# Patient Record
Sex: FEMALE | Race: OTHER | NOT HISPANIC OR LATINO | URBAN - METROPOLITAN AREA
[De-identification: names, ages, dates, MRNs, and addresses within clinical notes are randomized per-mention and may not be internally consistent; named-entity substitution may affect disease eponyms.]

---

## 2019-07-26 ENCOUNTER — EMERGENCY (EMERGENCY)
Facility: HOSPITAL | Age: 59
LOS: 0 days | Discharge: HOME | End: 2019-07-27
Attending: EMERGENCY MEDICINE | Admitting: EMERGENCY MEDICINE
Payer: COMMERCIAL

## 2019-07-26 VITALS
SYSTOLIC BLOOD PRESSURE: 143 MMHG | OXYGEN SATURATION: 98 % | TEMPERATURE: 99 F | DIASTOLIC BLOOD PRESSURE: 71 MMHG | HEART RATE: 108 BPM | RESPIRATION RATE: 19 BRPM

## 2019-07-26 DIAGNOSIS — N89.8 OTHER SPECIFIED NONINFLAMMATORY DISORDERS OF VAGINA: ICD-10-CM

## 2019-07-26 DIAGNOSIS — R35.0 FREQUENCY OF MICTURITION: ICD-10-CM

## 2019-07-26 DIAGNOSIS — N93.9 ABNORMAL UTERINE AND VAGINAL BLEEDING, UNSPECIFIED: ICD-10-CM

## 2019-07-26 LAB
ALBUMIN SERPL ELPH-MCNC: 4.4 G/DL — SIGNIFICANT CHANGE UP (ref 3.5–5.2)
ALP SERPL-CCNC: 93 U/L — SIGNIFICANT CHANGE UP (ref 30–115)
ALT FLD-CCNC: 18 U/L — SIGNIFICANT CHANGE UP (ref 0–41)
ANION GAP SERPL CALC-SCNC: 13 MMOL/L — SIGNIFICANT CHANGE UP (ref 7–14)
APPEARANCE UR: ABNORMAL
APTT BLD: 32 SEC — SIGNIFICANT CHANGE UP (ref 27–39.2)
AST SERPL-CCNC: 19 U/L — SIGNIFICANT CHANGE UP (ref 0–41)
BACTERIA # UR AUTO: ABNORMAL /HPF
BASOPHILS # BLD AUTO: 0.04 K/UL — SIGNIFICANT CHANGE UP (ref 0–0.2)
BASOPHILS NFR BLD AUTO: 0.7 % — SIGNIFICANT CHANGE UP (ref 0–1)
BILIRUB SERPL-MCNC: 0.3 MG/DL — SIGNIFICANT CHANGE UP (ref 0.2–1.2)
BILIRUB UR-MCNC: NEGATIVE — SIGNIFICANT CHANGE UP
BLD GP AB SCN SERPL QL: SIGNIFICANT CHANGE UP
BUN SERPL-MCNC: 13 MG/DL — SIGNIFICANT CHANGE UP (ref 10–20)
CALCIUM SERPL-MCNC: 9.6 MG/DL — SIGNIFICANT CHANGE UP (ref 8.5–10.1)
CHLORIDE SERPL-SCNC: 103 MMOL/L — SIGNIFICANT CHANGE UP (ref 98–110)
CO2 SERPL-SCNC: 26 MMOL/L — SIGNIFICANT CHANGE UP (ref 17–32)
COLOR SPEC: YELLOW — SIGNIFICANT CHANGE UP
CREAT SERPL-MCNC: 0.7 MG/DL — SIGNIFICANT CHANGE UP (ref 0.7–1.5)
DIFF PNL FLD: ABNORMAL
EOSINOPHIL # BLD AUTO: 0.11 K/UL — SIGNIFICANT CHANGE UP (ref 0–0.7)
EOSINOPHIL NFR BLD AUTO: 1.8 % — SIGNIFICANT CHANGE UP (ref 0–8)
GLUCOSE SERPL-MCNC: 101 MG/DL — HIGH (ref 70–99)
GLUCOSE UR QL: NEGATIVE — SIGNIFICANT CHANGE UP
HCT VFR BLD CALC: 41 % — SIGNIFICANT CHANGE UP (ref 37–47)
HGB BLD-MCNC: 13.7 G/DL — SIGNIFICANT CHANGE UP (ref 12–16)
IMM GRANULOCYTES NFR BLD AUTO: 0.3 % — SIGNIFICANT CHANGE UP (ref 0.1–0.3)
INR BLD: 1 RATIO — SIGNIFICANT CHANGE UP (ref 0.65–1.3)
KETONES UR-MCNC: NEGATIVE — SIGNIFICANT CHANGE UP
LEUKOCYTE ESTERASE UR-ACNC: ABNORMAL
LYMPHOCYTES # BLD AUTO: 2.91 K/UL — SIGNIFICANT CHANGE UP (ref 1.2–3.4)
LYMPHOCYTES # BLD AUTO: 48.3 % — SIGNIFICANT CHANGE UP (ref 20.5–51.1)
MCHC RBC-ENTMCNC: 28.2 PG — SIGNIFICANT CHANGE UP (ref 27–31)
MCHC RBC-ENTMCNC: 33.4 G/DL — SIGNIFICANT CHANGE UP (ref 32–37)
MCV RBC AUTO: 84.4 FL — SIGNIFICANT CHANGE UP (ref 81–99)
MONOCYTES # BLD AUTO: 0.31 K/UL — SIGNIFICANT CHANGE UP (ref 0.1–0.6)
MONOCYTES NFR BLD AUTO: 5.1 % — SIGNIFICANT CHANGE UP (ref 1.7–9.3)
NEUTROPHILS # BLD AUTO: 2.63 K/UL — SIGNIFICANT CHANGE UP (ref 1.4–6.5)
NEUTROPHILS NFR BLD AUTO: 43.8 % — SIGNIFICANT CHANGE UP (ref 42.2–75.2)
NITRITE UR-MCNC: NEGATIVE — SIGNIFICANT CHANGE UP
NRBC # BLD: 0 /100 WBCS — SIGNIFICANT CHANGE UP (ref 0–0)
PH UR: 6 — SIGNIFICANT CHANGE UP (ref 5–8)
PLATELET # BLD AUTO: 332 K/UL — SIGNIFICANT CHANGE UP (ref 130–400)
POTASSIUM SERPL-MCNC: 4.1 MMOL/L — SIGNIFICANT CHANGE UP (ref 3.5–5)
POTASSIUM SERPL-SCNC: 4.1 MMOL/L — SIGNIFICANT CHANGE UP (ref 3.5–5)
PROT SERPL-MCNC: 7.5 G/DL — SIGNIFICANT CHANGE UP (ref 6–8)
PROT UR-MCNC: 100
PROTHROM AB SERPL-ACNC: 11.5 SEC — SIGNIFICANT CHANGE UP (ref 9.95–12.87)
RBC # BLD: 4.86 M/UL — SIGNIFICANT CHANGE UP (ref 4.2–5.4)
RBC # FLD: 13.1 % — SIGNIFICANT CHANGE UP (ref 11.5–14.5)
RBC CASTS # UR COMP ASSIST: ABNORMAL /HPF
SODIUM SERPL-SCNC: 142 MMOL/L — SIGNIFICANT CHANGE UP (ref 135–146)
SP GR SPEC: 1.02 — SIGNIFICANT CHANGE UP (ref 1.01–1.03)
UROBILINOGEN FLD QL: 0.2 — SIGNIFICANT CHANGE UP (ref 0.2–0.2)
WBC # BLD: 6.02 K/UL — SIGNIFICANT CHANGE UP (ref 4.8–10.8)
WBC # FLD AUTO: 6.02 K/UL — SIGNIFICANT CHANGE UP (ref 4.8–10.8)
WBC UR QL: ABNORMAL /HPF

## 2019-07-26 PROCEDURE — 76830 TRANSVAGINAL US NON-OB: CPT | Mod: 26

## 2019-07-26 PROCEDURE — 99284 EMERGENCY DEPT VISIT MOD MDM: CPT

## 2019-07-26 NOTE — ED PROVIDER NOTE - ATTENDING CONTRIBUTION TO CARE
58 yo f with no pmh, recently dx with ovarian neoplasm, has an appt with dr. umanzor next week, presents with c/o vaginal bleeding since yesterday.  pt says she is using about 6 pads a day.  no abd pain.  pt was dx with ovarian mass 2 months ago, confirmed on MRI recently.  her gyn dr. Saunders had recommended hysterectomy, but pt wanted a 2nd opinion with dr. umanzor.  pt called dr umanzor's office today due to new bleeding and was told to go to ED for evaluation.  pt denies f/c, urinarys sx, back pain, weight loss, cp, sob.  pt admits has been having vaginal dc for 2 weeks.  exam: nad, ncat, perrl, eomi, mmm, rrr, ctab, abd soft, nt,nd aox3, pelvic exam as per dr. swanson imp: pt with ovarian mass, here with vaginal bleeding.  labs, us, gyn consult.

## 2019-07-26 NOTE — ED PROVIDER NOTE - PHYSICAL EXAMINATION
CONSTITUTIONAL: Well-developed; well-nourished; in no acute distress.   SKIN: warm, dry  HEAD: Normocephalic; atraumatic.  EYES: no conjunctival injection. PERRLA. EOMI.   ENT: No nasal discharge; airway clear.  NECK: Supple; non tender.  CARD: S1, S2 normal; no murmurs, gallops, or rubs. Regular rate and rhythm.   RESP: No wheezes, rales or rhonchi.  ABD: soft ntnd. BS+ in all 4 quadrants. no CVA tenderness. no suprapubic tenderness.   EXT: Normal ROM.  No clubbing, cyanosis or edema.   LYMPH: No acute cervical adenopathy.  NEURO: Alert, oriented, grossly unremarkable.  PSYCH: Cooperative, appropriate.

## 2019-07-26 NOTE — ED ADULT NURSE NOTE - OBJECTIVE STATEMENT
Pt came in today for light vaginal bleeding since yesterday. Pt recently had MRI, US showing ovarian and midline lesion. Pt looking for second opinion.

## 2019-07-26 NOTE — ED PROVIDER NOTE - NS ED ROS FT
Review of Systems:  CONSTITUTIONAL: No fever, No diaphoresis, No weight change  SKIN: No rash  HEMATOLOGIC: No abnormal bleeding or bruising  EYES: No eye pain, No blurred vision  ENT: No change in hearing, No sore throat, No neck pain, No rhinorrhea, No ear pain  RESPIRATORY: No shortness of breath, No cough  CARDIAC: No chest pain, No palpitations  GI: No abdominal pain, No nausea, No vomiting, No diarrhea, No constipation, No bright red blood per rectum or melena. No flank pain  : No dysuria, or hematuria. (+) frequency. (+) vaginal bleeding. (+) vaginal discharge.  MUSCULOSKELETAL: No joint paint, No swelling, No back pain  NEUROLOGIC: No numbness, No focal weakness, No headache, No dizziness  All other systems negative, unless specified in HPI

## 2019-07-26 NOTE — ED PROVIDER NOTE - CONSULTANT FREE TEXT FOR MDM DISCUSSED CASE WITH QUESTION
Problem: Patient Care Overview  Goal: Plan of Care Review  Outcome: Ongoing (interventions implemented as appropriate)  Patient moving around better today. PT doc 20 FT with flora-walker and minimal asist with weight-bearing to LLE. Patient ambulated w/walker assist to front solarium. Family visit and social outside today per Dr. Gauthier's approval. Afebrile. Hydrocodone prn pain. Agrees with plan of care. No further questions.        GYN Dr. Quinones

## 2019-07-26 NOTE — CONSULT NOTE ADULT - ASSESSMENT
60 yo  LMP at age 49 with adnexal mass, clinically stable  -Recommend , CEA, , Inhibin A, Inhibin B    FINAL RECOMMENDATIONS PENDING ATTENDING REVIEW 58 yo  LMP at age 49 with adnexal mass and resolved vaginal bleeding, clinically stable  -Recommend , CEA, , Inhibin A, Inhibin B, to be drawn in ED  -Recommended endometrial biopsy, patient declined as she is not comfortable with the exam and is planning to have a hysterectomy in the near future.  -Follow up with gyn oncology as scheduled on  at 1:00pm  -Disposition per ED    Discussed with Dr. Sarah and Dr. Arita

## 2019-07-26 NOTE — ED PROVIDER NOTE - NSFOLLOWUPINSTRUCTIONS_ED_ALL_ED_FT
Postmenopausal Bleeding    Postmenopausal bleeding is any bleeding that happens after menopause. Menopause is when a woman's period stops. Any type of bleeding after menopause should be checked by your doctor. Treatment will depend on the cause.    Follow these instructions at home:  Pay attention to any changes in your symptoms.  Avoid using tampons and douches as told by your doctor.  Change your pads regularly.  Get regular pelvic exams and Pap tests.  Take iron pills as told by your doctor.  Take over-the-counter and prescription medicines only as told by your doctor.  Keep all follow-up visits as told by your doctor. This is important.  Contact a doctor if:  Your bleeding lasts for more than 1 week.  You have belly (abdominal) pain.  You have bleeding during or after sex (intercourse).  You have bleeding that happens more often than every 3 weeks.  Get help right away if:  You have a fever, chills, a headache, dizziness, muscle aches, and bleeding.  You have strong pain with bleeding.  You have clumps of blood (blood clots) coming from your vagina.  You have a lot of bleeding and:  You need more than 1 pad an hour.  This has never happened before.  You feel like you are going to pass out (faint).  Summary  Any type of bleeding after menopause should be checked by your doctor.  Pay attention to any changes in your symptoms.  Keep all follow-up visits as told by your doctor.  This information is not intended to replace advice given to you by your health care provider. Make sure you discuss any questions you have with your health care provider.

## 2019-07-26 NOTE — ED PROVIDER NOTE - CLINICAL SUMMARY MEDICAL DECISION MAKING FREE TEXT BOX
Hx recent dx ovarian CA presenting with vaginal bleeding/discharge. labs, imaging reviewed. D/w Dr. Quinones, OBGYN. Said patient okay for discharge. Offered endometrial biopsy but pt refused. Has appt with gyn/onc on Tuesday. Given return precautions. Will send tumor markers and DC home, he will follow tumor markers.

## 2019-07-26 NOTE — ED PROVIDER NOTE - PROGRESS NOTE DETAILS
kiki - Pelvic exam performed. No blood in the vault. bright yellow discharge pooling. cervical os nonerythematous and without active discharge. kiki - spoke with US tech. pt has right ovarian neoplasm extending to left side measuring 12cm. unable to visualize hydrosalpinx. will call gyn. kiki - spoke to Sravan from [facility], will come [place]. D/w Dr. Quinones, OBGYN. Said patient okay for discharge. Offered endometrial biopsy but pt refused. Has appt with gyn/onc on Tuesday. Given return precautions. Will send tumor markers and DC home.

## 2019-07-26 NOTE — CONSULT NOTE ADULT - SUBJECTIVE AND OBJECTIVE BOX
Chief Complaint: Vaginal bleeding    HPI: 60 yo  LMP at age 49, h/o hypercholesterolemia presented to ED for 2 weeks of yellow vaginal discharge and 1 day of light vaginal bleeding. The discharge is scant and does not require pads. The vaginal bleeding required 4-5 pads today, lightly stained. She saw Dr. Lockhart in [place] and was found to have a pelvic mass and a possible "swollen tube" according to outpatient sonogram and MRI done in [place] (reports and images not available). She denies dizziness, weakness, fevers, chills, shortness of breath, chest pain, nausea, vomiting, dysuria, hematuria, diarrhea, or constipation. She was referred by Dr. Lockhart to Dr. Mcnally for Gyn Onc consultation and has an appointment with him on .    Ob/Gyn History:     Full term vaginal delivery x3, SAB x1              LMP - 10 years ago, age 49  Denies history of ovarian cysts, uterine fibroids, abnormal paps, or STIs  Last Pap Smear - 1 year ago, normal per patient  Last Mammogram - 1 year ago, normal per patient  Last Colonoscopy - Never      Denies the following: constitutional symptoms, visual symptoms, cardiovascular symptoms, respiratory symptoms, GI symptoms, musculoskeletal symptoms, skin symptoms, neurologic symptoms, hematologic symptoms, allergic symptoms, psychiatric symptoms  Except any pertinent positives listed.     Home Medications:  Atorvastatin 10mg daily    Allergies  No Known Allergies    PAST MEDICAL & SURGICAL HISTORY:  Denies    FAMILY HISTORY:  Denies    SOCIAL HISTORY: Denies cigarette use, alcohol use, or illicit drug use    Vital Signs Last 24 Hrs  T(F): 98.9 (2019 15:56), Max: 98.9 (2019 15:56)  HR: 108 (2019 15:56) (108 - 108)  BP: 143/71 (2019 15:56) (143/71 - 143/71)  RR: 19 (2019 15:56) (19 - 19)    General Appearance - AAOx3, NAD  Heart - S1S2 regular rate and rhythm  Lung - CTA Bilaterally  Abdomen - Soft, nontender, nondistended, no rebound, no rigidity, no guarding, bowel sounds present. Palpable firm, freely mobile mildline lower abdominal mass    GYN/Pelvis:    Labia Majora - Normal, atrophic  Labia Minora - Normal, atrophic  Clitoris - Normal, atrophic  Urethra - Normal, atrophic  Vagina - Normal, atrophic, no blood, no lesions  Cervix - Normal, atrophic, scant clear yellow discharge, no active bleeding, no CMT, cervix freely mobile.    Uterus:  Midline 14 cm mass, freely mobile, firm. Unable to differentiate uterus from adnexal mass. No tenderness.      LABS:                        13.7   6.02  )-----------( 332      ( 2019 19:00 )             41.0       ABO RH Interpretation: O POS [2019 20:27  [id number]  No historical record of blood group and Rh, requires a second sample for  retype prior to the release of any blood products.  For prenatal, a  second sample on next visit.] (19 @ 19:00)  Antibody Screen: NEG (19 @ 19:00)        142  |  103  |  13  ----------------------------<  101<H>  4.1   |  26  |  0.7    Ca    9.6      2019 19:00    TPro  7.5  /  Alb  4.4  /  TBili  0.3  /  DBili  x   /  AST  19  /  ALT  18  /  AlkPhos  93      PT/INR - ( 2019 19:00 )   PT: 11.50 sec;   INR: 1.00 ratio         PTT - ( 2019 19:00 )  PTT:32.0 sec  Urinalysis Basic - ( 2019 19:00 )    Color: Yellow / Appearance: Cloudy / S.020 / pH: x  Gluc: x / Ketone: Negative  / Bili: Negative / Urobili: 0.2   Blood: x / Protein: 100 / Nitrite: Negative   Leuk Esterase: Small / RBC: 11-25 /HPF / WBC 6-10 /HPF   Sq Epi: x / Non Sq Epi: x / Bacteria: Few /HPF      RADIOLOGY & ADDITIONAL STUDIES:    < from: US Transvaginal (19 @ 21:32) >  ******PRELIMINARY REPORT******    ******PRELIMINARY REPORT******          INTERPRETATION:  CLINICAL HISTORY: Pelvic mass    COMPARISON: None.    PROCEDURE: Transabdominal and transvaginal ultrasound of the pelvis was   performed, including Doppler.    LMP:     FINDINGS:    UTERUS: The uterus measures 7.4 x 5.6 x 3.8 cm, with normal echogenicity   and morphology. The endometrial echo complex measures 0.3 cm, which is   normal in thickness. A very trace amount of endometrial fluid is seen.    RIGHT OVARY: Within the right adnexa, extending past the midline, a   part-cystic, heterogeneous mass with internal vascularity measures 12.4 x   11.2 x 7.2 cm without a distinguishable native ovary. An associated   dilated tubular structure is seen adjacent to this mass, possibly   representing hydrosalpinx.    LEFT OVARY: Nonspecific nonvisualization of the left ovary.     OTHER: No free fluid in the pelvis.    IMPRESSION:    12.4 cm nonspecific, partially-cystic, heterogeneous mass in the right   adnexa crossing the midline, likely arising from the right ovary. There   is likely an associated right hydrosalpinx. Note that given the size of   the above structure, laterality cannot be definitely ascertained.    Gynecologic consultation recommended.    The above findings were documented in the patient's electronic medical   record by the clinicalservice.       ******PRELIMINARY REPORT******    ******PRELIMINARY REPORT******      < end of copied text >

## 2019-07-27 VITALS
SYSTOLIC BLOOD PRESSURE: 118 MMHG | HEART RATE: 72 BPM | TEMPERATURE: 96 F | RESPIRATION RATE: 18 BRPM | DIASTOLIC BLOOD PRESSURE: 68 MMHG

## 2019-07-27 LAB
CANCER AG125 SERPL-ACNC: 88 U/ML — HIGH
CANCER AG19-9 SERPL-ACNC: 693 U/ML — HIGH
CEA SERPL-MCNC: 7.2 NG/ML — HIGH (ref 0–3.8)

## 2019-07-30 ENCOUNTER — APPOINTMENT (OUTPATIENT)
Dept: GYNECOLOGIC ONCOLOGY | Facility: CLINIC | Age: 59
End: 2019-07-30
Payer: COMMERCIAL

## 2019-07-30 VITALS
DIASTOLIC BLOOD PRESSURE: 70 MMHG | WEIGHT: 148 LBS | HEIGHT: 59 IN | HEART RATE: 76 BPM | BODY MASS INDEX: 29.84 KG/M2 | SYSTOLIC BLOOD PRESSURE: 130 MMHG | RESPIRATION RATE: 18 BRPM

## 2019-07-30 DIAGNOSIS — Z78.9 OTHER SPECIFIED HEALTH STATUS: ICD-10-CM

## 2019-07-30 LAB — INHIBIN B SERUM: < 10 PG/ML — SIGNIFICANT CHANGE UP

## 2019-07-30 PROCEDURE — 99204 OFFICE O/P NEW MOD 45 MIN: CPT

## 2019-07-30 RX ORDER — ATORVASTATIN CALCIUM 10 MG/1
10 TABLET, FILM COATED ORAL
Refills: 0 | Status: ACTIVE | COMMUNITY

## 2019-07-30 NOTE — LETTER BODY
[Attached please find my note.] : Attached please find my note. [I recently saw our patient [unfilled] for a follow-up visit.] : I recently saw our patient, [unfilled] for a follow-up visit. [Dear  ___] : Dear  [unfilled],

## 2019-07-30 NOTE — HISTORY OF PRESENT ILLNESS
[FreeTextEntry1] : 60 yo  postmenopausal female, w/ LMP at the age of 48 yo, here for vaginal discharge for 2 weeks that started having a bloody tinge since 2019. Pt denies any pelvic pain. Initially went to her PMD for abnormal vaginal discharge. Pt was worked up and found to have a large ovarian mass w/ complex features and hydrosalpinx. Denies any urinary and bowel changes, unintentional weight loss and loss of appetite. \par \par TVUS (2019): Uterus is mildly elongated, heterogenous in echogenicity measuring 9.0 x 2.7 x 5.8 cm, probable fibroid in the anterior uterus measuring 0.9 x 0.8 x 0.8 cm, no other uterine mass seen, endometrial lining 0.2 cm. Right ovary measures 7.0 x 6.7 x 9.7 cm and contains a hypoechogenic cyst with low-level internal echoes measuring 6.1 x 5.2 x 6.4 cm most consistent w/ a hemorrhagic cyst or endometrioma. Left ovary measuring 3.7 x4.3 x 4.6 cm w/ hypoechogenic lesion measuring 1.3 x 1.8 x 2.0 cm, not hypervascular, may represent a complex cyst or less like solid lesion.\par \par MRI (2019): midline complex cystic and solid enhancing lesion likely originating from the ovary/ovaries concerning for cystic ovarian neoplasm. Suspicion of left-sided hydrosalpinx, small intrauterine fibroids.\par \par Complete Pelvic Sonogram (2019): The uterus measures 7.4 x 5.6 x 3.8 cm, with normal echogenicity \par and morphology. The endometrial echo complex measures 0.3 cm, which is normal in thickness. A very trace amount of endometrial fluid is seen. Within the right adnexa, extending past the midline, a part-cystic, heterogeneous mass with internal vascularity measures 12.4 x 11.2 x 7.2 cm without a distinguishable native ovary. An associated dilated tubular structure is seen adjacent to this mass, possibly representing hydrosalpinx. Nonspecific nonvisualization of the left ovary. No free fluid in the pelvis.\par \par 2019:\par CEA 7.2\par CA 19-9 693 \par CA-125 88\par \par Last pap smear: May 2019 - wnl per pt and her daughter\par Last mammogram: up to date and wnl per pt and her daughter\par Last colonoscopy: never had one

## 2019-07-30 NOTE — PHYSICAL EXAM
[Normal] : Recto-Vaginal Exam: Normal [Abnormal] : Adnexa(ae): Abnormal [de-identified] : fullness on the right side

## 2019-07-30 NOTE — REVIEW OF SYSTEMS
[Negative] : Musculoskeletal [Vaginal Discharge] : vaginal discharge [Abn Vag Bleeding] : abnormal vaginal bleeding [Hematuria] : no hematuria [Dyspareunia] : no dyspareunia [Dysuria] : no dysuria [Incontinence] : no incontinence

## 2019-07-30 NOTE — PAST MEDICAL HISTORY
[Total Preg ___] : G[unfilled] [Live Births ___] : P[unfilled]  [Full Term ___] : Full Term: [unfilled] [Living ___] : Living: [unfilled] [AB Spont ___] : miscarriages: [unfilled]  [Menarche Age ____] : age at menarche was [unfilled] [Menopause Age____] : age at menopause was [unfilled] [Postmenopausal] : The patient is postmenopausal [Premature ___] : Premature: [unfilled] [Abortions ___] : Abortions:[unfilled] [Ectopics ___] : ectopic pregnancies: [unfilled]  [AB Induced ___] : elective abortions: [unfilled]  [Multiple Births ___] :  multiple birth pregnancies: [unfilled] [de-identified] : 49 [FreeTextEntry3] : menoapause

## 2019-07-30 NOTE — DISCUSSION/SUMMARY
[FreeTextEntry1] : 60 yo P3 postmenopausal female, w/ LMP at the age of 49, w/ abnormal vaginal discharge that is now bloody tinged, and ovarian mass suspicious for malignancy w/ complex features and a hydrosalpinx on MRI/TUVS, otherwise no complaints, \par \par -Referral to medical clearance\par -Schedule surgery (TLH BSO, possible staging, possible laparotomy) after medical clearance to be completed

## 2019-07-30 NOTE — OB HISTORY
[Total Preg ___] : : [unfilled] [Full Term ___] : [unfilled] (full-term) [Living ___] : [unfilled] (living) [Vaginal ___] : [unfilled] vaginal delivery(s) [AB Spont ___] : [unfilled] miscarriage(s) [Abortions ___] : [unfilled] (abortions) [ ___] : [unfilled]  section delivery(s) [AB Induced ___] : [unfilled] elective (s) [Ectopics ___] : [unfilled] ectopic pregnancies [Multiple Births ___] : [unfilled] multiple births [Menarche Age ____] : age at menarche was [unfilled] [Menopause  Age ____] : menopause occurred at age [unfilled]

## 2019-08-01 LAB — INHIBIN A SERPL-MCNC: 5 PG/ML — SIGNIFICANT CHANGE UP

## 2019-08-05 ENCOUNTER — OUTPATIENT (OUTPATIENT)
Dept: OUTPATIENT SERVICES | Facility: HOSPITAL | Age: 59
LOS: 1 days | Discharge: HOME | End: 2019-08-05
Payer: COMMERCIAL

## 2019-08-05 VITALS
HEIGHT: 62 IN | HEART RATE: 76 BPM | OXYGEN SATURATION: 100 % | WEIGHT: 149.91 LBS | SYSTOLIC BLOOD PRESSURE: 128 MMHG | RESPIRATION RATE: 17 BRPM | TEMPERATURE: 98 F | DIASTOLIC BLOOD PRESSURE: 66 MMHG

## 2019-08-05 DIAGNOSIS — N83.9 NONINFLAMMATORY DISORDER OF OVARY, FALLOPIAN TUBE AND BROAD LIGAMENT, UNSPECIFIED: ICD-10-CM

## 2019-08-05 DIAGNOSIS — K02.9 DENTAL CARIES, UNSPECIFIED: Chronic | ICD-10-CM

## 2019-08-05 DIAGNOSIS — Z01.818 ENCOUNTER FOR OTHER PREPROCEDURAL EXAMINATION: ICD-10-CM

## 2019-08-05 DIAGNOSIS — R19.05 PERIUMBILIC SWELLING, MASS OR LUMP: ICD-10-CM

## 2019-08-05 LAB
ALBUMIN SERPL ELPH-MCNC: 4.4 G/DL — SIGNIFICANT CHANGE UP (ref 3.5–5.2)
ALP SERPL-CCNC: 91 U/L — SIGNIFICANT CHANGE UP (ref 30–115)
ALT FLD-CCNC: 23 U/L — SIGNIFICANT CHANGE UP (ref 0–41)
ANION GAP SERPL CALC-SCNC: 9 MMOL/L — SIGNIFICANT CHANGE UP (ref 7–14)
APPEARANCE UR: CLEAR — SIGNIFICANT CHANGE UP
APTT BLD: 32.5 SEC — SIGNIFICANT CHANGE UP (ref 27–39.2)
AST SERPL-CCNC: 22 U/L — SIGNIFICANT CHANGE UP (ref 0–41)
BILIRUB SERPL-MCNC: 0.3 MG/DL — SIGNIFICANT CHANGE UP (ref 0.2–1.2)
BILIRUB UR-MCNC: NEGATIVE — SIGNIFICANT CHANGE UP
BLD GP AB SCN SERPL QL: SIGNIFICANT CHANGE UP
BUN SERPL-MCNC: 15 MG/DL — SIGNIFICANT CHANGE UP (ref 10–20)
CALCIUM SERPL-MCNC: 9.1 MG/DL — SIGNIFICANT CHANGE UP (ref 8.5–10.1)
CHLORIDE SERPL-SCNC: 105 MMOL/L — SIGNIFICANT CHANGE UP (ref 98–110)
CO2 SERPL-SCNC: 25 MMOL/L — SIGNIFICANT CHANGE UP (ref 17–32)
COLOR SPEC: COLORLESS — SIGNIFICANT CHANGE UP
CREAT SERPL-MCNC: 0.7 MG/DL — SIGNIFICANT CHANGE UP (ref 0.7–1.5)
DIFF PNL FLD: NEGATIVE — SIGNIFICANT CHANGE UP
GLUCOSE SERPL-MCNC: 111 MG/DL — HIGH (ref 70–99)
GLUCOSE UR QL: NEGATIVE — SIGNIFICANT CHANGE UP
HCT VFR BLD CALC: 40.5 % — SIGNIFICANT CHANGE UP (ref 37–47)
HGB BLD-MCNC: 13.1 G/DL — SIGNIFICANT CHANGE UP (ref 12–16)
INR BLD: 0.93 RATIO — SIGNIFICANT CHANGE UP (ref 0.65–1.3)
KETONES UR-MCNC: NEGATIVE — SIGNIFICANT CHANGE UP
LEUKOCYTE ESTERASE UR-ACNC: NEGATIVE — SIGNIFICANT CHANGE UP
MCHC RBC-ENTMCNC: 27.5 PG — SIGNIFICANT CHANGE UP (ref 27–31)
MCHC RBC-ENTMCNC: 32.3 G/DL — SIGNIFICANT CHANGE UP (ref 32–37)
MCV RBC AUTO: 85.1 FL — SIGNIFICANT CHANGE UP (ref 81–99)
NITRITE UR-MCNC: NEGATIVE — SIGNIFICANT CHANGE UP
NRBC # BLD: 0 /100 WBCS — SIGNIFICANT CHANGE UP (ref 0–0)
PH UR: 6 — SIGNIFICANT CHANGE UP (ref 5–8)
PLATELET # BLD AUTO: 301 K/UL — SIGNIFICANT CHANGE UP (ref 130–400)
POTASSIUM SERPL-MCNC: 4.6 MMOL/L — SIGNIFICANT CHANGE UP (ref 3.5–5)
POTASSIUM SERPL-SCNC: 4.6 MMOL/L — SIGNIFICANT CHANGE UP (ref 3.5–5)
PROT SERPL-MCNC: 7.4 G/DL — SIGNIFICANT CHANGE UP (ref 6–8)
PROT UR-MCNC: NEGATIVE — SIGNIFICANT CHANGE UP
PROTHROM AB SERPL-ACNC: 10.7 SEC — SIGNIFICANT CHANGE UP (ref 9.95–12.87)
RBC # BLD: 4.76 M/UL — SIGNIFICANT CHANGE UP (ref 4.2–5.4)
RBC # FLD: 13.3 % — SIGNIFICANT CHANGE UP (ref 11.5–14.5)
SODIUM SERPL-SCNC: 139 MMOL/L — SIGNIFICANT CHANGE UP (ref 135–146)
SP GR SPEC: 1 — LOW (ref 1.01–1.02)
UROBILINOGEN FLD QL: SIGNIFICANT CHANGE UP
WBC # BLD: 5.27 K/UL — SIGNIFICANT CHANGE UP (ref 4.8–10.8)
WBC # FLD AUTO: 5.27 K/UL — SIGNIFICANT CHANGE UP (ref 4.8–10.8)

## 2019-08-05 PROCEDURE — 71046 X-RAY EXAM CHEST 2 VIEWS: CPT | Mod: 26

## 2019-08-05 PROCEDURE — 93010 ELECTROCARDIOGRAM REPORT: CPT

## 2019-08-05 NOTE — H&P PST ADULT - REASON FOR ADMISSION
Pt denies cp palp uri cough dysuria or sob. ET 1 FOS denies SOB . PT denies any open wounds, drainage or rashes. scheduled for total lap hysterectomy bilateral salpingo -oopherectomies.

## 2019-08-05 NOTE — H&P PST ADULT - BP NONINVASIVE SYSTOLIC (MM HG)
Labs reviewed, we will discuss your lab in detail at the upcoming appointment. This copy is provided for your records. 128

## 2019-08-12 ENCOUNTER — RESULT REVIEW (OUTPATIENT)
Age: 59
End: 2019-08-12

## 2019-08-12 ENCOUNTER — APPOINTMENT (OUTPATIENT)
Dept: GYNECOLOGIC ONCOLOGY | Facility: HOSPITAL | Age: 59
End: 2019-08-12
Payer: COMMERCIAL

## 2019-08-12 ENCOUNTER — INPATIENT (INPATIENT)
Facility: HOSPITAL | Age: 59
LOS: 3 days | Discharge: HOME | End: 2019-08-16
Attending: SPECIALIST | Admitting: SPECIALIST
Payer: COMMERCIAL

## 2019-08-12 VITALS
TEMPERATURE: 99 F | HEART RATE: 70 BPM | WEIGHT: 149.91 LBS | SYSTOLIC BLOOD PRESSURE: 118 MMHG | RESPIRATION RATE: 18 BRPM | HEIGHT: 59 IN | DIASTOLIC BLOOD PRESSURE: 62 MMHG

## 2019-08-12 DIAGNOSIS — R19.00 INTRA-ABDOMINAL AND PELVIC SWELLING, MASS AND LUMP, UNSPECIFIED SITE: ICD-10-CM

## 2019-08-12 DIAGNOSIS — E78.00 PURE HYPERCHOLESTEROLEMIA, UNSPECIFIED: ICD-10-CM

## 2019-08-12 DIAGNOSIS — D62 ACUTE POSTHEMORRHAGIC ANEMIA: ICD-10-CM

## 2019-08-12 DIAGNOSIS — C56.2 MALIGNANT NEOPLASM OF LEFT OVARY: ICD-10-CM

## 2019-08-12 DIAGNOSIS — C56.1 MALIGNANT NEOPLASM OF RIGHT OVARY: ICD-10-CM

## 2019-08-12 DIAGNOSIS — R33.8 OTHER RETENTION OF URINE: ICD-10-CM

## 2019-08-12 DIAGNOSIS — Z53.31 LAPAROSCOPIC SURGICAL PROCEDURE CONVERTED TO OPEN PROCEDURE: ICD-10-CM

## 2019-08-12 DIAGNOSIS — K02.9 DENTAL CARIES, UNSPECIFIED: Chronic | ICD-10-CM

## 2019-08-12 LAB
ANION GAP SERPL CALC-SCNC: 12 MMOL/L — SIGNIFICANT CHANGE UP (ref 7–14)
BASOPHILS # BLD AUTO: 0.01 K/UL — SIGNIFICANT CHANGE UP (ref 0–0.2)
BASOPHILS NFR BLD AUTO: 0.1 % — SIGNIFICANT CHANGE UP (ref 0–1)
BUN SERPL-MCNC: 7 MG/DL — LOW (ref 10–20)
CALCIUM SERPL-MCNC: 8.4 MG/DL — LOW (ref 8.5–10.1)
CHLORIDE SERPL-SCNC: 104 MMOL/L — SIGNIFICANT CHANGE UP (ref 98–110)
CO2 SERPL-SCNC: 25 MMOL/L — SIGNIFICANT CHANGE UP (ref 17–32)
CREAT SERPL-MCNC: 0.6 MG/DL — LOW (ref 0.7–1.5)
EOSINOPHIL # BLD AUTO: 0 K/UL — SIGNIFICANT CHANGE UP (ref 0–0.7)
EOSINOPHIL NFR BLD AUTO: 0 % — SIGNIFICANT CHANGE UP (ref 0–8)
GLUCOSE BLDC GLUCOMTR-MCNC: 125 MG/DL — HIGH (ref 70–99)
GLUCOSE BLDC GLUCOMTR-MCNC: 163 MG/DL — HIGH (ref 70–99)
GLUCOSE BLDC GLUCOMTR-MCNC: 184 MG/DL — HIGH (ref 70–99)
GLUCOSE SERPL-MCNC: 157 MG/DL — HIGH (ref 70–99)
HCT VFR BLD CALC: 34.6 % — LOW (ref 37–47)
HGB BLD-MCNC: 11.2 G/DL — LOW (ref 12–16)
IMM GRANULOCYTES NFR BLD AUTO: 0.4 % — HIGH (ref 0.1–0.3)
LYMPHOCYTES # BLD AUTO: 0.95 K/UL — LOW (ref 1.2–3.4)
LYMPHOCYTES # BLD AUTO: 8.6 % — LOW (ref 20.5–51.1)
MAGNESIUM SERPL-MCNC: 1.7 MG/DL — LOW (ref 1.8–2.4)
MCHC RBC-ENTMCNC: 27.5 PG — SIGNIFICANT CHANGE UP (ref 27–31)
MCHC RBC-ENTMCNC: 32.4 G/DL — SIGNIFICANT CHANGE UP (ref 32–37)
MCV RBC AUTO: 84.8 FL — SIGNIFICANT CHANGE UP (ref 81–99)
MONOCYTES # BLD AUTO: 0.48 K/UL — SIGNIFICANT CHANGE UP (ref 0.1–0.6)
MONOCYTES NFR BLD AUTO: 4.4 % — SIGNIFICANT CHANGE UP (ref 1.7–9.3)
NEUTROPHILS # BLD AUTO: 9.51 K/UL — HIGH (ref 1.4–6.5)
NEUTROPHILS NFR BLD AUTO: 86.5 % — HIGH (ref 42.2–75.2)
NRBC # BLD: 0 /100 WBCS — SIGNIFICANT CHANGE UP (ref 0–0)
PHOSPHATE SERPL-MCNC: 3.4 MG/DL — SIGNIFICANT CHANGE UP (ref 2.1–4.9)
PLATELET # BLD AUTO: 255 K/UL — SIGNIFICANT CHANGE UP (ref 130–400)
POTASSIUM SERPL-MCNC: 4.6 MMOL/L — SIGNIFICANT CHANGE UP (ref 3.5–5)
POTASSIUM SERPL-SCNC: 4.6 MMOL/L — SIGNIFICANT CHANGE UP (ref 3.5–5)
RBC # BLD: 4.08 M/UL — LOW (ref 4.2–5.4)
RBC # FLD: 13 % — SIGNIFICANT CHANGE UP (ref 11.5–14.5)
SODIUM SERPL-SCNC: 141 MMOL/L — SIGNIFICANT CHANGE UP (ref 135–146)
WBC # BLD: 10.99 K/UL — HIGH (ref 4.8–10.8)
WBC # FLD AUTO: 10.99 K/UL — HIGH (ref 4.8–10.8)

## 2019-08-12 PROCEDURE — 58954 TAH RAD DEBULK/LYMPH REMOVE: CPT

## 2019-08-12 PROCEDURE — 49320 DIAG LAPARO SEPARATE PROC: CPT | Mod: 59

## 2019-08-12 PROCEDURE — 88112 CYTOPATH CELL ENHANCE TECH: CPT | Mod: 26

## 2019-08-12 PROCEDURE — 88342 IMHCHEM/IMCYTCHM 1ST ANTB: CPT | Mod: 26

## 2019-08-12 PROCEDURE — 88304 TISSUE EXAM BY PATHOLOGIST: CPT | Mod: 26

## 2019-08-12 PROCEDURE — 88344 IMHCHEM/IMCYTCHM EA MLT ANTB: CPT | Mod: 26,59

## 2019-08-12 PROCEDURE — 88307 TISSUE EXAM BY PATHOLOGIST: CPT | Mod: 26

## 2019-08-12 PROCEDURE — 88305 TISSUE EXAM BY PATHOLOGIST: CPT | Mod: 26

## 2019-08-12 PROCEDURE — 88341 IMHCHEM/IMCYTCHM EA ADD ANTB: CPT | Mod: 26

## 2019-08-12 PROCEDURE — 44955 APPENDECTOMY ADD-ON: CPT

## 2019-08-12 RX ORDER — MORPHINE SULFATE 50 MG/1
4 CAPSULE, EXTENDED RELEASE ORAL
Refills: 0 | Status: DISCONTINUED | OUTPATIENT
Start: 2019-08-12 | End: 2019-08-12

## 2019-08-12 RX ORDER — MORPHINE SULFATE 50 MG/1
30 CAPSULE, EXTENDED RELEASE ORAL
Refills: 0 | Status: DISCONTINUED | OUTPATIENT
Start: 2019-08-12 | End: 2019-08-13

## 2019-08-12 RX ORDER — FAMOTIDINE 10 MG/ML
20 INJECTION INTRAVENOUS
Refills: 0 | Status: DISCONTINUED | OUTPATIENT
Start: 2019-08-12 | End: 2019-08-16

## 2019-08-12 RX ORDER — DOCUSATE SODIUM 100 MG
100 CAPSULE ORAL
Refills: 0 | Status: DISCONTINUED | OUTPATIENT
Start: 2019-08-12 | End: 2019-08-16

## 2019-08-12 RX ORDER — SODIUM CHLORIDE 9 MG/ML
1000 INJECTION, SOLUTION INTRAVENOUS
Refills: 0 | Status: DISCONTINUED | OUTPATIENT
Start: 2019-08-12 | End: 2019-08-13

## 2019-08-12 RX ORDER — ONDANSETRON 8 MG/1
4 TABLET, FILM COATED ORAL EVERY 6 HOURS
Refills: 0 | Status: DISCONTINUED | OUTPATIENT
Start: 2019-08-12 | End: 2019-08-16

## 2019-08-12 RX ORDER — ATORVASTATIN CALCIUM 80 MG/1
10 TABLET, FILM COATED ORAL AT BEDTIME
Refills: 0 | Status: DISCONTINUED | OUTPATIENT
Start: 2019-08-12 | End: 2019-08-16

## 2019-08-12 RX ORDER — ATORVASTATIN CALCIUM 80 MG/1
1 TABLET, FILM COATED ORAL
Qty: 0 | Refills: 0 | DISCHARGE

## 2019-08-12 RX ORDER — SIMETHICONE 80 MG/1
80 TABLET, CHEWABLE ORAL EVERY 4 HOURS
Refills: 0 | Status: DISCONTINUED | OUTPATIENT
Start: 2019-08-12 | End: 2019-08-16

## 2019-08-12 RX ORDER — ONDANSETRON 8 MG/1
4 TABLET, FILM COATED ORAL ONCE
Refills: 0 | Status: COMPLETED | OUTPATIENT
Start: 2019-08-12 | End: 2019-08-12

## 2019-08-12 RX ORDER — MEPERIDINE HYDROCHLORIDE 50 MG/ML
12.5 INJECTION INTRAMUSCULAR; INTRAVENOUS; SUBCUTANEOUS
Refills: 0 | Status: DISCONTINUED | OUTPATIENT
Start: 2019-08-12 | End: 2019-08-12

## 2019-08-12 RX ORDER — ENOXAPARIN SODIUM 100 MG/ML
40 INJECTION SUBCUTANEOUS EVERY 24 HOURS
Refills: 0 | Status: DISCONTINUED | OUTPATIENT
Start: 2019-08-13 | End: 2019-08-13

## 2019-08-12 RX ORDER — DEXAMETHASONE 0.5 MG/5ML
4 ELIXIR ORAL ONCE
Refills: 0 | Status: DISCONTINUED | OUTPATIENT
Start: 2019-08-12 | End: 2019-08-12

## 2019-08-12 RX ORDER — SODIUM CHLORIDE 9 MG/ML
1000 INJECTION, SOLUTION INTRAVENOUS
Refills: 0 | Status: DISCONTINUED | OUTPATIENT
Start: 2019-08-12 | End: 2019-08-12

## 2019-08-12 RX ORDER — MORPHINE SULFATE 50 MG/1
2 CAPSULE, EXTENDED RELEASE ORAL
Refills: 0 | Status: DISCONTINUED | OUTPATIENT
Start: 2019-08-12 | End: 2019-08-12

## 2019-08-12 RX ADMIN — SIMETHICONE 80 MILLIGRAM(S): 80 TABLET, CHEWABLE ORAL at 21:20

## 2019-08-12 RX ADMIN — ATORVASTATIN CALCIUM 10 MILLIGRAM(S): 80 TABLET, FILM COATED ORAL at 21:20

## 2019-08-12 RX ADMIN — MORPHINE SULFATE 30 MILLILITER(S): 50 CAPSULE, EXTENDED RELEASE ORAL at 17:15

## 2019-08-12 RX ADMIN — ONDANSETRON 4 MILLIGRAM(S): 8 TABLET, FILM COATED ORAL at 17:39

## 2019-08-12 RX ADMIN — MORPHINE SULFATE 4 MILLIGRAM(S): 50 CAPSULE, EXTENDED RELEASE ORAL at 17:12

## 2019-08-12 RX ADMIN — MORPHINE SULFATE 2 MILLIGRAM(S): 50 CAPSULE, EXTENDED RELEASE ORAL at 17:08

## 2019-08-12 RX ADMIN — MORPHINE SULFATE 4 MILLIGRAM(S): 50 CAPSULE, EXTENDED RELEASE ORAL at 16:42

## 2019-08-12 RX ADMIN — SODIUM CHLORIDE 125 MILLILITER(S): 9 INJECTION, SOLUTION INTRAVENOUS at 17:16

## 2019-08-12 RX ADMIN — MORPHINE SULFATE 2 MILLIGRAM(S): 50 CAPSULE, EXTENDED RELEASE ORAL at 17:38

## 2019-08-12 NOTE — BRIEF OPERATIVE NOTE - OPERATION/FINDINGS
EUA: 6 cm right adnexal mass palpated, mobile, uterus around 8cm in size, anteverted, mobile, normal left adnexa, cervix closed with no massess  Diag lap: large left adnexal mass, around 4cm in size, friable, large right adnexal mass around 6 cm in size, no normal ovary identified bilaterally, right mass completely adherent to right pelvic pelvic side wall, unable to dissect it off safely, unable to identify IP ligament on right side. At this point decision made to convert to Ex Lap.  Ex Lap: the same findings listed above, able to deliver specimen through vertical incision due to extensive lysis of adhesions, right ureter dissected out and tagged, left ureter visualized, both with good peristalsis. Bilateral IP ligaments identified. No visible lesions on omentum, diaphragm or to other structures in the pelvis.  Appendix appears normal  No enlarged lymph nodes(para aortic, iliac)  Frozen section: primary ovarian Ca, endometrioid type, no cancer visualized in uterus EUA: 6 cm right adnexal mass palpated, mobile, uterus around 8cm in size, anteverted, mobile, normal left adnexa, cervix closed with no massess  Diag lap: large left adnexal mass, around 6cm in size, friable, large right adnexal mass around 8 cm in size, no normal ovary identified bilaterally, right mass completely adherent to right pelvic side wall, unable to dissect it off safely, unable to identify IP ligament on right side. At this point decision made to convert to Ex Lap.  Ex Lap: the same findings listed above, able to deliver specimen through vertical incision due to extensive lysis of adhesions, right ureter dissected out and tagged, left ureter visualized, both with good peristalsis. Bilateral IP ligaments identified. No visible lesions on omentum, diaphragm or to other structures in the pelvis.  Appendix appears normal  No enlarged lymph nodes(para aortic, iliac)  Frozen section: primary ovarian Ca, endometrioid type, no cancer visualized in uterus

## 2019-08-12 NOTE — BRIEF OPERATIVE NOTE - NSICDXBRIEFPROCEDURE_GEN_ALL_CORE_FT
PROCEDURES:  Omentectomy 12-Aug-2019 16:43:14  Mireya Daniel  Appendectomy 12-Aug-2019 16:40:23  Mireya Daniel  Open pelvic lymph node dissection 12-Aug-2019 16:40:12  Mireya Daniel  Exploratory laparotomy with total abdominal hysterectomy and bilateral salpingo-oophorectomy for staging 12-Aug-2019 16:39:39  Mireya Daniel  Laparoscopy, diagnostic, with exploratory laparotomy 12-Aug-2019 16:38:44  Mireya Daniel

## 2019-08-12 NOTE — CHART NOTE - NSCHARTNOTEFT_GEN_A_CORE
PAC[facility] ANESTHESIA ADMISSION NOTE      Procedure: Appendectomy  Open pelvic lymph node dissection  Exploratory laparotomy with total abdominal hysterectomy and bilateral salpingo-oophorectomy for staging  Laparoscopy, diagnostic, with exploratory laparotomy    Post op diagnosis:  Pelvic mass      ____  Intubated  TV:______       Rate: ______      FiO2: ______    _x___  Patent Airway    _x___  Full return of protective reflexes    _x___  Full recovery from anesthesia / back to baseline status    Vitals:  T(F): 98.4   HR: 68  BP: 125/66  RR: 12  SpO2: 100%    Mental Status:  _x___ Awake   _x____ Alert   _____ Drowsy   _____ Sedated    Nausea/Vomiting:  _x___  NO       ______Yes,   See Post - Op Orders         Pain Scale (0-10):  __4__    Treatment: ____ None    _x___ See Post - Op/PCA Orders    Post - Operative Fluids:   ____ Oral   _x___ See Post - Op Orders    Plan: Discharge:   ____Home       __x___Floor     _____Critical Care    _____  Other:_________________    Comments:  No anesthesia issues or complications noted.  Discharge when criteria met.

## 2019-08-12 NOTE — BRIEF OPERATIVE NOTE - SPECIMENS
uterus, fallopian tubes, cervix, ovaries, portion of omentum, appendix, lymph nodes-paraaortic, illiac

## 2019-08-12 NOTE — PROGRESS NOTE ADULT - SUBJECTIVE AND OBJECTIVE BOX
PGY 4 Note    Patient examined at bedside. Pain  2-3/10 in lower abdomen, not really needing to use the PCA for pain control, feels well overall. Denies fevers/chills, HA/N/V, CP/SOB/palpitations, vaginal bleeding, hematuria/dysuria, constipation/diarrhea. Tolerating clear diet, passing no flatus yet. Not Ambulating.     T(F): 96.1 (08-12-19 @ 19:00), Max: 98.9 (08-12-19 @ 10:00)  HR: 77 (08-12-19 @ 19:00) (66 - 89)  BP: 18/58 (08-12-19 @ 19:00) (18/58 - 125/66)  RR: 18 (08-12-19 @ 19:00) (12 - 18)  SpO2: 100% (08-12-19 @ 19:00) (95% - 100%)    I&O's Summary    12 Aug 2019 07:01  -  12 Aug 2019 23:43  --------------------------------------------------------  IN: 125 mL / OUT: 815 mL / NET: -690 mL    Urine:   08-12-19 @ 07:01  -  08-12-19 @ 23:43  --------------------------------------------------------  IN: 0 mL / OUT: 815 mL / NET: -815 mL    CAPILLARY BLOOD GLUCOSE  POCT Blood Glucose.: 163 mg/dL (12 Aug 2019 17:12)  POCT Blood Glucose.: 184 mg/dL (12 Aug 2019 15:06)  POCT Blood Glucose.: 125 mg/dL (12 Aug 2019 09:45)    Physical Exam:  General: AAOx3. NAD  CVS: RRR. Nl S1S2  Lungs: CTAB  Abdomen: soft, non-tender, non-distended, +BSx4  Incision: C/D/I  VE: deferred, no bleeding on pad/chux  Ext: No edema. SCDs in place    Labs:             11.2<L>  10.99<H> )-----------( 255      ( 08-12 @ 21:38 )             34.6<L>    Creatinine, Serum: 0.7 (08-05)  Creatinine, Serum: 0.7 (07-26)    Medications:  MEDICATIONS  (STANDING):  atorvastatin 10 milliGRAM(s) Oral at bedtime  docusate sodium 100 milliGRAM(s) Oral two times a day  famotidine    Tablet 20 milliGRAM(s) Oral two times a day  lactated ringers. 1000 milliLiter(s) (125 mL/Hr) IV Continuous <Continuous>  morphine PCA (5 mG/mL) 30 milliLiter(s) PCA Continuous PCA Continuous  simethicone 80 milliGRAM(s) Chew every 4 hours    MEDICATIONS  (PRN):  ondansetron Injectable 4 milliGRAM(s) IV Push every 6 hours PRN Nausea and/or Vomiting    A/P:  59y P3 with adnexal mass s/p diagnostic laparoscopy converted to LUCINA BSO, omentectomy, pelvic/para-aortic node dissection, appendectomy EBL 400cc. Frozen: endometroid ca of the ovaries  POD 0  Neuro: AAOx3, NAD; PCA for pain control  Cardio: f/u VS  Pulm: Incentive spirometry ordered and encouraged  ID: f/u postop labs  Heme: H/H stable, appropriate drop, repeat CBC in AM, and will start lovenox  Endo: f/u FS  GI: regular diet as tolerated  DVT prophylaxis: SCDs in place, Lovenox in AM PGY 4 Note    Patient examined at bedside. Pain  2-3/10 in lower abdomen, not really needing to use the PCA for pain control, feels well overall. Denies fevers/chills, HA/N/V, CP/SOB/palpitations, vaginal bleeding, hematuria/dysuria, constipation/diarrhea. Tolerating clear diet, passing no flatus yet. Not Ambulating.     T(F): 96.1 (08-12-19 @ 19:00), Max: 98.9 (08-12-19 @ 10:00)  HR: 79 (08-12-19 @ 23:00) (66 - 89)  BP: 116/59 (08-12-19 @ 23:00) (116/58 - 125/66)  RR: 18 (08-12-19 @ 19:00) (12 - 18)  SpO2: 100% (08-12-19 @ 19:00) (95% - 100%)    I&O's Summary    12 Aug 2019 07:01  -  12 Aug 2019 23:43  --------------------------------------------------------  IN: 125 mL / OUT: 815 mL / NET: -690 mL    Urine:   08-12-19 @ 07:01  -  08-12-19 @ 23:43  --------------------------------------------------------  IN: 0 mL / OUT: 815 mL / NET: -815 mL    CAPILLARY BLOOD GLUCOSE  POCT Blood Glucose.: 163 mg/dL (12 Aug 2019 17:12)  POCT Blood Glucose.: 184 mg/dL (12 Aug 2019 15:06)  POCT Blood Glucose.: 125 mg/dL (12 Aug 2019 09:45)    Physical Exam:  General: AAOx3. NAD  CVS: RRR. Nl S1S2  Lungs: CTAB  Abdomen: soft, non-tender, non-distended, +BSx4  Incision: C/D/I  VE: deferred, no bleeding on pad/chux  Ext: No edema. SCDs in place    Labs:             11.2<L>  10.99<H> )-----------( 255      ( 08-12 @ 21:38 )             34.6<L>    Creatinine, Serum: 0.7 (08-05)  Creatinine, Serum: 0.7 (07-26)    Medications:  MEDICATIONS  (STANDING):  atorvastatin 10 milliGRAM(s) Oral at bedtime  docusate sodium 100 milliGRAM(s) Oral two times a day  famotidine    Tablet 20 milliGRAM(s) Oral two times a day  lactated ringers. 1000 milliLiter(s) (125 mL/Hr) IV Continuous <Continuous>  morphine PCA (5 mG/mL) 30 milliLiter(s) PCA Continuous PCA Continuous  simethicone 80 milliGRAM(s) Chew every 4 hours    MEDICATIONS  (PRN):  ondansetron Injectable 4 milliGRAM(s) IV Push every 6 hours PRN Nausea and/or Vomiting    A/P:  59y P3 with adnexal mass s/p diagnostic laparoscopy converted to LUCINA BSO, omentectomy, pelvic/para-aortic node dissection, appendectomy EBL 400cc. Frozen: endometroid ca of the ovaries  POD 0  Neuro: AAOx3, NAD; PCA for pain control  Cardio: f/u VS  Pulm: Incentive spirometry ordered and encouraged  ID: f/u postop labs  Heme: H/H stable, appropriate drop, repeat CBC in AM, and will start lovenox  Endo: f/u FS  GI: regular diet as tolerated  DVT prophylaxis: SCDs in place, Lovenox in AM

## 2019-08-13 LAB
ANION GAP SERPL CALC-SCNC: 11 MMOL/L — SIGNIFICANT CHANGE UP (ref 7–14)
BASOPHILS # BLD AUTO: 0.01 K/UL — SIGNIFICANT CHANGE UP (ref 0–0.2)
BASOPHILS # BLD AUTO: 0.01 K/UL — SIGNIFICANT CHANGE UP (ref 0–0.2)
BASOPHILS NFR BLD AUTO: 0.1 % — SIGNIFICANT CHANGE UP (ref 0–1)
BASOPHILS NFR BLD AUTO: 0.1 % — SIGNIFICANT CHANGE UP (ref 0–1)
BUN SERPL-MCNC: 8 MG/DL — LOW (ref 10–20)
CALCIUM SERPL-MCNC: 8.4 MG/DL — LOW (ref 8.5–10.1)
CHLORIDE SERPL-SCNC: 104 MMOL/L — SIGNIFICANT CHANGE UP (ref 98–110)
CO2 SERPL-SCNC: 26 MMOL/L — SIGNIFICANT CHANGE UP (ref 17–32)
CREAT SERPL-MCNC: 0.8 MG/DL — SIGNIFICANT CHANGE UP (ref 0.7–1.5)
EOSINOPHIL # BLD AUTO: 0 K/UL — SIGNIFICANT CHANGE UP (ref 0–0.7)
EOSINOPHIL # BLD AUTO: 0 K/UL — SIGNIFICANT CHANGE UP (ref 0–0.7)
EOSINOPHIL NFR BLD AUTO: 0 % — SIGNIFICANT CHANGE UP (ref 0–8)
EOSINOPHIL NFR BLD AUTO: 0 % — SIGNIFICANT CHANGE UP (ref 0–8)
GLUCOSE SERPL-MCNC: 149 MG/DL — HIGH (ref 70–99)
HCT VFR BLD CALC: 31.1 % — LOW (ref 37–47)
HCT VFR BLD CALC: 31.5 % — LOW (ref 37–47)
HGB BLD-MCNC: 10.2 G/DL — LOW (ref 12–16)
HGB BLD-MCNC: 10.4 G/DL — LOW (ref 12–16)
IMM GRANULOCYTES NFR BLD AUTO: 0.4 % — HIGH (ref 0.1–0.3)
IMM GRANULOCYTES NFR BLD AUTO: 0.4 % — HIGH (ref 0.1–0.3)
LYMPHOCYTES # BLD AUTO: 1.47 K/UL — SIGNIFICANT CHANGE UP (ref 1.2–3.4)
LYMPHOCYTES # BLD AUTO: 1.62 K/UL — SIGNIFICANT CHANGE UP (ref 1.2–3.4)
LYMPHOCYTES # BLD AUTO: 17.9 % — LOW (ref 20.5–51.1)
LYMPHOCYTES # BLD AUTO: 21.7 % — SIGNIFICANT CHANGE UP (ref 20.5–51.1)
MAGNESIUM SERPL-MCNC: 2.3 MG/DL — SIGNIFICANT CHANGE UP (ref 1.8–2.4)
MCHC RBC-ENTMCNC: 27.7 PG — SIGNIFICANT CHANGE UP (ref 27–31)
MCHC RBC-ENTMCNC: 27.8 PG — SIGNIFICANT CHANGE UP (ref 27–31)
MCHC RBC-ENTMCNC: 32.8 G/DL — SIGNIFICANT CHANGE UP (ref 32–37)
MCHC RBC-ENTMCNC: 33 G/DL — SIGNIFICANT CHANGE UP (ref 32–37)
MCV RBC AUTO: 83.8 FL — SIGNIFICANT CHANGE UP (ref 81–99)
MCV RBC AUTO: 84.7 FL — SIGNIFICANT CHANGE UP (ref 81–99)
MONOCYTES # BLD AUTO: 0.54 K/UL — SIGNIFICANT CHANGE UP (ref 0.1–0.6)
MONOCYTES # BLD AUTO: 0.54 K/UL — SIGNIFICANT CHANGE UP (ref 0.1–0.6)
MONOCYTES NFR BLD AUTO: 6.6 % — SIGNIFICANT CHANGE UP (ref 1.7–9.3)
MONOCYTES NFR BLD AUTO: 7.2 % — SIGNIFICANT CHANGE UP (ref 1.7–9.3)
NEUTROPHILS # BLD AUTO: 5.26 K/UL — SIGNIFICANT CHANGE UP (ref 1.4–6.5)
NEUTROPHILS # BLD AUTO: 6.17 K/UL — SIGNIFICANT CHANGE UP (ref 1.4–6.5)
NEUTROPHILS NFR BLD AUTO: 70.6 % — SIGNIFICANT CHANGE UP (ref 42.2–75.2)
NEUTROPHILS NFR BLD AUTO: 75 % — SIGNIFICANT CHANGE UP (ref 42.2–75.2)
NRBC # BLD: 0 /100 WBCS — SIGNIFICANT CHANGE UP (ref 0–0)
NRBC # BLD: 0 /100 WBCS — SIGNIFICANT CHANGE UP (ref 0–0)
PHOSPHATE SERPL-MCNC: 3.8 MG/DL — SIGNIFICANT CHANGE UP (ref 2.1–4.9)
PLATELET # BLD AUTO: 257 K/UL — SIGNIFICANT CHANGE UP (ref 130–400)
PLATELET # BLD AUTO: 262 K/UL — SIGNIFICANT CHANGE UP (ref 130–400)
POTASSIUM SERPL-MCNC: 4.4 MMOL/L — SIGNIFICANT CHANGE UP (ref 3.5–5)
POTASSIUM SERPL-SCNC: 4.4 MMOL/L — SIGNIFICANT CHANGE UP (ref 3.5–5)
RBC # BLD: 3.67 M/UL — LOW (ref 4.2–5.4)
RBC # BLD: 3.76 M/UL — LOW (ref 4.2–5.4)
RBC # FLD: 13.2 % — SIGNIFICANT CHANGE UP (ref 11.5–14.5)
RBC # FLD: 13.2 % — SIGNIFICANT CHANGE UP (ref 11.5–14.5)
SODIUM SERPL-SCNC: 141 MMOL/L — SIGNIFICANT CHANGE UP (ref 135–146)
WBC # BLD: 7.46 K/UL — SIGNIFICANT CHANGE UP (ref 4.8–10.8)
WBC # BLD: 8.22 K/UL — SIGNIFICANT CHANGE UP (ref 4.8–10.8)
WBC # FLD AUTO: 7.46 K/UL — SIGNIFICANT CHANGE UP (ref 4.8–10.8)
WBC # FLD AUTO: 8.22 K/UL — SIGNIFICANT CHANGE UP (ref 4.8–10.8)

## 2019-08-13 RX ORDER — OXYCODONE HYDROCHLORIDE 5 MG/1
10 TABLET ORAL EVERY 6 HOURS
Refills: 0 | Status: DISCONTINUED | OUTPATIENT
Start: 2019-08-13 | End: 2019-08-16

## 2019-08-13 RX ORDER — MAGNESIUM SULFATE 500 MG/ML
2 VIAL (ML) INJECTION ONCE
Refills: 0 | Status: COMPLETED | OUTPATIENT
Start: 2019-08-13 | End: 2019-08-13

## 2019-08-13 RX ORDER — GABAPENTIN 400 MG/1
300 CAPSULE ORAL THREE TIMES A DAY
Refills: 0 | Status: DISCONTINUED | OUTPATIENT
Start: 2019-08-13 | End: 2019-08-16

## 2019-08-13 RX ORDER — ACETAMINOPHEN 500 MG
650 TABLET ORAL EVERY 6 HOURS
Refills: 0 | Status: DISCONTINUED | OUTPATIENT
Start: 2019-08-13 | End: 2019-08-16

## 2019-08-13 RX ORDER — PHENAZOPYRIDINE HCL 100 MG
200 TABLET ORAL THREE TIMES A DAY
Refills: 0 | Status: DISCONTINUED | OUTPATIENT
Start: 2019-08-13 | End: 2019-08-16

## 2019-08-13 RX ORDER — IBUPROFEN 200 MG
600 TABLET ORAL EVERY 6 HOURS
Refills: 0 | Status: DISCONTINUED | OUTPATIENT
Start: 2019-08-13 | End: 2019-08-16

## 2019-08-13 RX ADMIN — ATORVASTATIN CALCIUM 10 MILLIGRAM(S): 80 TABLET, FILM COATED ORAL at 21:16

## 2019-08-13 RX ADMIN — Medication 600 MILLIGRAM(S): at 17:54

## 2019-08-13 RX ADMIN — GABAPENTIN 300 MILLIGRAM(S): 400 CAPSULE ORAL at 21:15

## 2019-08-13 RX ADMIN — Medication 200 MILLIGRAM(S): at 18:51

## 2019-08-13 RX ADMIN — OXYCODONE HYDROCHLORIDE 10 MILLIGRAM(S): 5 TABLET ORAL at 11:00

## 2019-08-13 RX ADMIN — Medication 600 MILLIGRAM(S): at 12:23

## 2019-08-13 RX ADMIN — Medication 650 MILLIGRAM(S): at 12:24

## 2019-08-13 RX ADMIN — OXYCODONE HYDROCHLORIDE 10 MILLIGRAM(S): 5 TABLET ORAL at 10:33

## 2019-08-13 RX ADMIN — Medication 650 MILLIGRAM(S): at 18:52

## 2019-08-13 RX ADMIN — SIMETHICONE 80 MILLIGRAM(S): 80 TABLET, CHEWABLE ORAL at 17:25

## 2019-08-13 RX ADMIN — Medication 50 GRAM(S): at 02:22

## 2019-08-13 RX ADMIN — SIMETHICONE 80 MILLIGRAM(S): 80 TABLET, CHEWABLE ORAL at 05:59

## 2019-08-13 RX ADMIN — ENOXAPARIN SODIUM 40 MILLIGRAM(S): 100 INJECTION SUBCUTANEOUS at 05:59

## 2019-08-13 RX ADMIN — FAMOTIDINE 20 MILLIGRAM(S): 10 INJECTION INTRAVENOUS at 05:59

## 2019-08-13 RX ADMIN — Medication 100 MILLIGRAM(S): at 17:25

## 2019-08-13 RX ADMIN — Medication 100 MILLIGRAM(S): at 05:59

## 2019-08-13 RX ADMIN — Medication 600 MILLIGRAM(S): at 17:25

## 2019-08-13 RX ADMIN — FAMOTIDINE 20 MILLIGRAM(S): 10 INJECTION INTRAVENOUS at 17:25

## 2019-08-13 RX ADMIN — SIMETHICONE 80 MILLIGRAM(S): 80 TABLET, CHEWABLE ORAL at 21:15

## 2019-08-13 RX ADMIN — SIMETHICONE 80 MILLIGRAM(S): 80 TABLET, CHEWABLE ORAL at 02:22

## 2019-08-13 RX ADMIN — SIMETHICONE 80 MILLIGRAM(S): 80 TABLET, CHEWABLE ORAL at 10:34

## 2019-08-13 RX ADMIN — Medication 200 MILLIGRAM(S): at 21:15

## 2019-08-13 NOTE — PROGRESS NOTE ADULT - SUBJECTIVE AND OBJECTIVE BOX
PGY 4 note    Patient seen at bedside and evaluated.  Abd pain controlled with PCA overnight.  Denies fever/chills, nausea/vomiting, diarrhea, chest pain, SOB.  Has not ambulated  Tolerated clears.  No flatus.  Staples in.    ICU Vital Signs Last 24 Hrs  T(C): 37.1 (13 Aug 2019 00:00), Max: 37.2 (12 Aug 2019 10:00)  T(F): 98.8 (13 Aug 2019 00:00), Max: 98.9 (12 Aug 2019 10:00)  HR: 75 (13 Aug 2019 00:00) (66 - 89)  BP: 116/59 (13 Aug 2019 00:00) (18/58 - 125/66)  BP(mean): --  ABP: --  ABP(mean): --  RR: 18 (13 Aug 2019 00:00) (12 - 18)  SpO2: 100% (12 Aug 2019 19:00) (95% - 100%)    GEN: NAD, AAO x 3  Heart: RRR  Lungs: CTAB  Abd: soft, appropriately tender, no r/g/r, +BS, non distended, vertical incision c/d/i  SVE: deferred, no blood on chux  Ext: no calf tenderness or edema b/l, SCDs on    Staples:  1300cc [id number]        LABS:                        11.2   10.99 )-----------( 255      ( 12 Aug 2019 21:38 )             34.6     Magnesium, Serum: 1.7 mg/dL (08-12 @ 21:38)->replaced    08-12-19 @ 21:38      141  |  104  |  7<L>  ----------------------------<  157<H>  4.6   |  25  |  0.6<L>        Ca    8.4<L>      12 Aug 2019 21:38  Phos  3.4     08-12  Mg     1.7<L>     08-12      meds  MEDICATIONS  (STANDING):  atorvastatin 10 milliGRAM(s) Oral at bedtime  docusate sodium 100 milliGRAM(s) Oral two times a day  enoxaparin Injectable 40 milliGRAM(s) SubCutaneous every 24 hours  famotidine    Tablet 20 milliGRAM(s) Oral two times a day  lactated ringers. 1000 milliLiter(s) (125 mL/Hr) IV Continuous <Continuous>  simethicone 80 milliGRAM(s) Chew every 4 hours    MEDICATIONS  (PRN):  ondansetron Injectable 4 milliGRAM(s) IV Push every 6 hours PRN Nausea and/or Vomiting    Medications given  atorvastatin   10 milliGRAM(s) Oral (08-12-19 @ 21:20)    docusate sodium   100 milliGRAM(s) Oral (08-13-19 @ 05:59)    enoxaparin Injectable   40 milliGRAM(s) SubCutaneous (08-13-19 @ 05:59)    famotidine    Tablet   20 milliGRAM(s) Oral (08-13-19 @ 05:59)    lactated ringers.   125 mL/Hr IV Continuous (08-12-19 @ 17:15)    magnesium sulfate  IVPB   50 mL/Hr IV Intermittent (08-13-19 @ 02:22)    morphine  - Injectable   4 milliGRAM(s) IV Push (08-12-19 @ 16:42)    morphine  - Injectable   2 milliGRAM(s) IV Push (08-12-19 @ 17:08)    ondansetron Injectable   4 milliGRAM(s) IV Push (08-12-19 @ 17:39)    simethicone   80 milliGRAM(s) Chew (08-12-19 @ 21:20)   80 milliGRAM(s) Chew (08-13-19 @ 02:22)   80 milliGRAM(s) Chew (08-13-19 @ 05:59)

## 2019-08-13 NOTE — PROGRESS NOTE ADULT - ASSESSMENT
60 yo P3 with adnexal mass, s/p diag lap, converted to ex lap, LUCINA BSO, omentectomy, pelvic and para aortic lymph node dissection, EBL 400cc, frozen with ovarian CA, POD 1 recovering well. Urine outpt adequate.  stiles dcd-->TOV in 6 hours  dc PCA pump, ERAS protocol for pain management  lovenox nightly  AM labs  advance diet to regular  reassess     Will inform Dr. Mcnally

## 2019-08-14 PROBLEM — E78.00 PURE HYPERCHOLESTEROLEMIA, UNSPECIFIED: Chronic | Status: ACTIVE | Noted: 2019-08-05

## 2019-08-14 LAB
ANION GAP SERPL CALC-SCNC: 9 MMOL/L — SIGNIFICANT CHANGE UP (ref 7–14)
BASOPHILS # BLD AUTO: 0.01 K/UL — SIGNIFICANT CHANGE UP (ref 0–0.2)
BASOPHILS # BLD AUTO: 0.02 K/UL — SIGNIFICANT CHANGE UP (ref 0–0.2)
BASOPHILS NFR BLD AUTO: 0.2 % — SIGNIFICANT CHANGE UP (ref 0–1)
BASOPHILS NFR BLD AUTO: 0.3 % — SIGNIFICANT CHANGE UP (ref 0–1)
BUN SERPL-MCNC: 13 MG/DL — SIGNIFICANT CHANGE UP (ref 10–20)
CALCIUM SERPL-MCNC: 8.4 MG/DL — LOW (ref 8.5–10.1)
CHLORIDE SERPL-SCNC: 103 MMOL/L — SIGNIFICANT CHANGE UP (ref 98–110)
CO2 SERPL-SCNC: 30 MMOL/L — SIGNIFICANT CHANGE UP (ref 17–32)
CREAT SERPL-MCNC: 0.8 MG/DL — SIGNIFICANT CHANGE UP (ref 0.7–1.5)
EOSINOPHIL # BLD AUTO: 0.02 K/UL — SIGNIFICANT CHANGE UP (ref 0–0.7)
EOSINOPHIL # BLD AUTO: 0.03 K/UL — SIGNIFICANT CHANGE UP (ref 0–0.7)
EOSINOPHIL NFR BLD AUTO: 0.3 % — SIGNIFICANT CHANGE UP (ref 0–8)
EOSINOPHIL NFR BLD AUTO: 0.5 % — SIGNIFICANT CHANGE UP (ref 0–8)
GLUCOSE SERPL-MCNC: 128 MG/DL — HIGH (ref 70–99)
HCT VFR BLD CALC: 28.7 % — LOW (ref 37–47)
HCT VFR BLD CALC: 28.9 % — LOW (ref 37–47)
HGB BLD-MCNC: 9 G/DL — LOW (ref 12–16)
HGB BLD-MCNC: 9.2 G/DL — LOW (ref 12–16)
IMM GRANULOCYTES NFR BLD AUTO: 0.5 % — HIGH (ref 0.1–0.3)
IMM GRANULOCYTES NFR BLD AUTO: 0.6 % — HIGH (ref 0.1–0.3)
LYMPHOCYTES # BLD AUTO: 1.79 K/UL — SIGNIFICANT CHANGE UP (ref 1.2–3.4)
LYMPHOCYTES # BLD AUTO: 2.13 K/UL — SIGNIFICANT CHANGE UP (ref 1.2–3.4)
LYMPHOCYTES # BLD AUTO: 27.8 % — SIGNIFICANT CHANGE UP (ref 20.5–51.1)
LYMPHOCYTES # BLD AUTO: 32.2 % — SIGNIFICANT CHANGE UP (ref 20.5–51.1)
MAGNESIUM SERPL-MCNC: 2.1 MG/DL — SIGNIFICANT CHANGE UP (ref 1.8–2.4)
MCHC RBC-ENTMCNC: 27 PG — SIGNIFICANT CHANGE UP (ref 27–31)
MCHC RBC-ENTMCNC: 27.2 PG — SIGNIFICANT CHANGE UP (ref 27–31)
MCHC RBC-ENTMCNC: 31.4 G/DL — LOW (ref 32–37)
MCHC RBC-ENTMCNC: 31.8 G/DL — LOW (ref 32–37)
MCV RBC AUTO: 85.5 FL — SIGNIFICANT CHANGE UP (ref 81–99)
MCV RBC AUTO: 86.2 FL — SIGNIFICANT CHANGE UP (ref 81–99)
MONOCYTES # BLD AUTO: 0.35 K/UL — SIGNIFICANT CHANGE UP (ref 0.1–0.6)
MONOCYTES # BLD AUTO: 0.46 K/UL — SIGNIFICANT CHANGE UP (ref 0.1–0.6)
MONOCYTES NFR BLD AUTO: 5.4 % — SIGNIFICANT CHANGE UP (ref 1.7–9.3)
MONOCYTES NFR BLD AUTO: 7 % — SIGNIFICANT CHANGE UP (ref 1.7–9.3)
NEUTROPHILS # BLD AUTO: 3.95 K/UL — SIGNIFICANT CHANGE UP (ref 1.4–6.5)
NEUTROPHILS # BLD AUTO: 4.21 K/UL — SIGNIFICANT CHANGE UP (ref 1.4–6.5)
NEUTROPHILS NFR BLD AUTO: 59.7 % — SIGNIFICANT CHANGE UP (ref 42.2–75.2)
NEUTROPHILS NFR BLD AUTO: 65.5 % — SIGNIFICANT CHANGE UP (ref 42.2–75.2)
NON-GYNECOLOGICAL CYTOLOGY STUDY: SIGNIFICANT CHANGE UP
NRBC # BLD: 0 /100 WBCS — SIGNIFICANT CHANGE UP (ref 0–0)
NRBC # BLD: 0 /100 WBCS — SIGNIFICANT CHANGE UP (ref 0–0)
PHOSPHATE SERPL-MCNC: 2 MG/DL — LOW (ref 2.1–4.9)
PLATELET # BLD AUTO: 223 K/UL — SIGNIFICANT CHANGE UP (ref 130–400)
PLATELET # BLD AUTO: 228 K/UL — SIGNIFICANT CHANGE UP (ref 130–400)
POTASSIUM SERPL-MCNC: 4.1 MMOL/L — SIGNIFICANT CHANGE UP (ref 3.5–5)
POTASSIUM SERPL-SCNC: 4.1 MMOL/L — SIGNIFICANT CHANGE UP (ref 3.5–5)
RBC # BLD: 3.33 M/UL — LOW (ref 4.2–5.4)
RBC # BLD: 3.38 M/UL — LOW (ref 4.2–5.4)
RBC # FLD: 13.4 % — SIGNIFICANT CHANGE UP (ref 11.5–14.5)
RBC # FLD: 13.5 % — SIGNIFICANT CHANGE UP (ref 11.5–14.5)
SODIUM SERPL-SCNC: 142 MMOL/L — SIGNIFICANT CHANGE UP (ref 135–146)
WBC # BLD: 6.43 K/UL — SIGNIFICANT CHANGE UP (ref 4.8–10.8)
WBC # BLD: 6.61 K/UL — SIGNIFICANT CHANGE UP (ref 4.8–10.8)
WBC # FLD AUTO: 6.43 K/UL — SIGNIFICANT CHANGE UP (ref 4.8–10.8)
WBC # FLD AUTO: 6.61 K/UL — SIGNIFICANT CHANGE UP (ref 4.8–10.8)

## 2019-08-14 RX ORDER — TRANEXAMIC ACID 100 MG/ML
1000 INJECTION, SOLUTION INTRAVENOUS ONCE
Refills: 0 | Status: COMPLETED | OUTPATIENT
Start: 2019-08-14 | End: 2019-08-14

## 2019-08-14 RX ADMIN — Medication 600 MILLIGRAM(S): at 06:55

## 2019-08-14 RX ADMIN — Medication 200 MILLIGRAM(S): at 05:42

## 2019-08-14 RX ADMIN — GABAPENTIN 300 MILLIGRAM(S): 400 CAPSULE ORAL at 05:41

## 2019-08-14 RX ADMIN — GABAPENTIN 300 MILLIGRAM(S): 400 CAPSULE ORAL at 21:17

## 2019-08-14 RX ADMIN — SIMETHICONE 80 MILLIGRAM(S): 80 TABLET, CHEWABLE ORAL at 21:17

## 2019-08-14 RX ADMIN — GABAPENTIN 300 MILLIGRAM(S): 400 CAPSULE ORAL at 14:34

## 2019-08-14 RX ADMIN — Medication 200 MILLIGRAM(S): at 15:08

## 2019-08-14 RX ADMIN — Medication 600 MILLIGRAM(S): at 12:16

## 2019-08-14 RX ADMIN — Medication 200 MILLIGRAM(S): at 21:17

## 2019-08-14 RX ADMIN — Medication 600 MILLIGRAM(S): at 17:10

## 2019-08-14 RX ADMIN — SIMETHICONE 80 MILLIGRAM(S): 80 TABLET, CHEWABLE ORAL at 17:09

## 2019-08-14 RX ADMIN — Medication 600 MILLIGRAM(S): at 17:08

## 2019-08-14 RX ADMIN — SIMETHICONE 80 MILLIGRAM(S): 80 TABLET, CHEWABLE ORAL at 05:42

## 2019-08-14 RX ADMIN — Medication 650 MILLIGRAM(S): at 05:42

## 2019-08-14 RX ADMIN — Medication 650 MILLIGRAM(S): at 12:47

## 2019-08-14 RX ADMIN — Medication 650 MILLIGRAM(S): at 12:17

## 2019-08-14 RX ADMIN — SIMETHICONE 80 MILLIGRAM(S): 80 TABLET, CHEWABLE ORAL at 14:34

## 2019-08-14 RX ADMIN — SIMETHICONE 80 MILLIGRAM(S): 80 TABLET, CHEWABLE ORAL at 10:23

## 2019-08-14 RX ADMIN — Medication 650 MILLIGRAM(S): at 06:55

## 2019-08-14 RX ADMIN — ATORVASTATIN CALCIUM 10 MILLIGRAM(S): 80 TABLET, FILM COATED ORAL at 21:17

## 2019-08-14 RX ADMIN — FAMOTIDINE 20 MILLIGRAM(S): 10 INJECTION INTRAVENOUS at 17:09

## 2019-08-14 RX ADMIN — Medication 100 MILLIGRAM(S): at 05:42

## 2019-08-14 RX ADMIN — Medication 600 MILLIGRAM(S): at 12:47

## 2019-08-14 RX ADMIN — Medication 100 MILLIGRAM(S): at 17:09

## 2019-08-14 RX ADMIN — Medication 600 MILLIGRAM(S): at 05:41

## 2019-08-14 RX ADMIN — Medication 650 MILLIGRAM(S): at 19:03

## 2019-08-14 RX ADMIN — Medication 650 MILLIGRAM(S): at 19:01

## 2019-08-14 RX ADMIN — TRANEXAMIC ACID 220 MILLIGRAM(S): 100 INJECTION, SOLUTION INTRAVENOUS at 10:23

## 2019-08-14 RX ADMIN — FAMOTIDINE 20 MILLIGRAM(S): 10 INJECTION INTRAVENOUS at 05:42

## 2019-08-14 NOTE — PROGRESS NOTE ADULT - SUBJECTIVE AND OBJECTIVE BOX
PGY2 Note    Pt seen and examined at bedside. No overnight events, reports 2 episodes of palpitations overnight but has been ambulating minimally without difficulty. Recovering well, tolerating regular diet, has not passed flatus yet. Denies fever/chills, nausea/vomiting, diarrhea, chest pain, SOB.    Staples is in place. Urine output 1400cc [id number] (adequate)    MEDICATIONS  (STANDING):  acetaminophen   Tablet .. 650 milliGRAM(s) Oral every 6 hours  atorvastatin 10 milliGRAM(s) Oral at bedtime  docusate sodium 100 milliGRAM(s) Oral two times a day  famotidine    Tablet 20 milliGRAM(s) Oral two times a day  gabapentin 300 milliGRAM(s) Oral three times a day  guaiFENesin  milliGRAM(s) Oral every 12 hours   ibuprofen  Tablet. 600 milliGRAM(s) Oral every 6 hours  phenazopyridine 200 milliGRAM(s) Oral three times a day  simethicone 80 milliGRAM(s) Chew every 4 hours    MEDICATIONS  (PRN):  ondansetron Injectable 4 milliGRAM(s) IV Push every 6 hours PRN Nausea and/or Vomiting  oxyCODONE    IR 10 milliGRAM(s) Oral every 6 hours PRN Moderate Pain (4 - 6)      PHYSICAL EXAM:  T(F): 97.4 (08-14 @ 04:00), Max: 98.8 (08-14 @ 00:00)  BP: 106/52 (08-14 @ 04:00) (100/51 - 108/56)  RR: 18 (08-14 @ 04:00)  General Appearance: NAD, AA0x3  Cardiac: RRR  Pulmonary: CTAB  Abdomen: soft, nontender, nondistended, no rebound, guarding, or rigidity. Pos BS.  Incision: clean, dry and intact; mild bruising around the incision site.   Extremities: no swelling, calf tenderness ore erythema    LABS:                                   9.2    6.61  )-----------( 228      ( 14 Aug 2019 05:55 )             28.9                         10.2   7.46  )-----------( 257      ( 13 Aug 2019 11:21 )             31.1                         10.4   8.22  )-----------( 262      ( 13 Aug 2019 06:46 )             31.5     08-14    142  |  103  |  13  ----------------------------<  128<H>  4.1   |  30  |  0.8    Ca    8.4<L>      14 Aug 2019 05:55  Phos  2.0     08-14  Mg     2.1     08-14 PGY2 Note    Pt seen and examined at bedside. No overnight events, reports 2 episodes of palpitations overnight but has been ambulating minimally without difficulty. Recovering well, tolerating regular diet, has not passed flatus yet. Denies fever/chills, nausea/vomiting, diarrhea, chest pain, SOB.    Staples is in place. Urine output 1400cc [id number] (adequate)    MEDICATIONS  (STANDING):  acetaminophen   Tablet .. 650 milliGRAM(s) Oral every 6 hours  atorvastatin 10 milliGRAM(s) Oral at bedtime  docusate sodium 100 milliGRAM(s) Oral two times a day  famotidine    Tablet 20 milliGRAM(s) Oral two times a day  gabapentin 300 milliGRAM(s) Oral three times a day  guaiFENesin  milliGRAM(s) Oral every 12 hours   ibuprofen  Tablet. 600 milliGRAM(s) Oral every 6 hours  phenazopyridine 200 milliGRAM(s) Oral three times a day  simethicone 80 milliGRAM(s) Chew every 4 hours    MEDICATIONS  (PRN):  ondansetron Injectable 4 milliGRAM(s) IV Push every 6 hours PRN Nausea and/or Vomiting  oxyCODONE    IR 10 milliGRAM(s) Oral every 6 hours PRN Moderate Pain (4 - 6)      PHYSICAL EXAM:  Vital Signs Last 24 Hrs  T(C): 36.1 (14 Aug 2019 07:34), Max: 37.1 (14 Aug 2019 00:00)  HR: 92 (14 Aug 2019 07:34) (90 - 96)  BP: 110/53 (14 Aug 2019 07:34) (100/59 - 110/53)  RR: 18 (14 Aug 2019 07:34) (18 - 18)    General Appearance: NAD, AA0x3  Cardiac: RRR  Pulmonary: CTAB  Abdomen: soft, nontender, nondistended, no rebound, guarding, or rigidity. Pos BS.  Incision: clean, dry and intact; mild bruising around the incision site.   Extremities: no swelling, calf tenderness ore erythema    LABS:                                   9.2    6.61  )-----------( 228      ( 14 Aug 2019 05:55 )             28.9                         10.2   7.46  )-----------( 257      ( 13 Aug 2019 11:21 )             31.1                         10.4   8.22  )-----------( 262      ( 13 Aug 2019 06:46 )             31.5     08-14    142  |  103  |  13  ----------------------------<  128<H>  4.1   |  30  |  0.8    Ca    8.4<L>      14 Aug 2019 05:55  Phos  2.0     08-14  Mg     2.1     08-14

## 2019-08-14 NOTE — PROGRESS NOTE ADULT - ASSESSMENT
58 yo P3 with adnexal mass, s/p diag lap, converted to ex lap, LUCINA BSO, omentectomy, pelvic and para aortic lymph node dissection, EBL 400cc, frozen with ovarian CA, POD 2, recovering well, with urine retention immediately postop, stiles replaced, urine output adequate, with hemoglobin drop    Remove stiles catheter 24h after placement  ERAS protocol for pain management  Hold lovenox  f/u repeat CBC  Regular diet  Ambulation and PO hydration encouraged  Incentive spirometry encouraged  reassess     Dr. Daniel aware. Will inform Dr. Mcnally

## 2019-08-15 ENCOUNTER — TRANSCRIPTION ENCOUNTER (OUTPATIENT)
Age: 59
End: 2019-08-15

## 2019-08-15 LAB
APTT BLD: 28.1 SEC — SIGNIFICANT CHANGE UP (ref 27–39.2)
BASOPHILS # BLD AUTO: 0.01 K/UL — SIGNIFICANT CHANGE UP (ref 0–0.2)
BASOPHILS NFR BLD AUTO: 0.2 % — SIGNIFICANT CHANGE UP (ref 0–1)
EOSINOPHIL # BLD AUTO: 0.11 K/UL — SIGNIFICANT CHANGE UP (ref 0–0.7)
EOSINOPHIL NFR BLD AUTO: 2.2 % — SIGNIFICANT CHANGE UP (ref 0–8)
GLUCOSE BLDC GLUCOMTR-MCNC: 127 MG/DL — HIGH (ref 70–99)
HCT VFR BLD CALC: 27.3 % — LOW (ref 37–47)
HGB BLD-MCNC: 8.7 G/DL — LOW (ref 12–16)
IMM GRANULOCYTES NFR BLD AUTO: 0.4 % — HIGH (ref 0.1–0.3)
INR BLD: 1.04 RATIO — SIGNIFICANT CHANGE UP (ref 0.65–1.3)
LYMPHOCYTES # BLD AUTO: 1.58 K/UL — SIGNIFICANT CHANGE UP (ref 1.2–3.4)
LYMPHOCYTES # BLD AUTO: 31.5 % — SIGNIFICANT CHANGE UP (ref 20.5–51.1)
MCHC RBC-ENTMCNC: 27.5 PG — SIGNIFICANT CHANGE UP (ref 27–31)
MCHC RBC-ENTMCNC: 31.9 G/DL — LOW (ref 32–37)
MCV RBC AUTO: 86.4 FL — SIGNIFICANT CHANGE UP (ref 81–99)
MONOCYTES # BLD AUTO: 0.26 K/UL — SIGNIFICANT CHANGE UP (ref 0.1–0.6)
MONOCYTES NFR BLD AUTO: 5.2 % — SIGNIFICANT CHANGE UP (ref 1.7–9.3)
NEUTROPHILS # BLD AUTO: 3.03 K/UL — SIGNIFICANT CHANGE UP (ref 1.4–6.5)
NEUTROPHILS NFR BLD AUTO: 60.5 % — SIGNIFICANT CHANGE UP (ref 42.2–75.2)
NRBC # BLD: 0 /100 WBCS — SIGNIFICANT CHANGE UP (ref 0–0)
PLATELET # BLD AUTO: 212 K/UL — SIGNIFICANT CHANGE UP (ref 130–400)
PROTHROM AB SERPL-ACNC: 12 SEC — SIGNIFICANT CHANGE UP (ref 9.95–12.87)
RBC # BLD: 3.16 M/UL — LOW (ref 4.2–5.4)
RBC # FLD: 13.3 % — SIGNIFICANT CHANGE UP (ref 11.5–14.5)
WBC # BLD: 5.01 K/UL — SIGNIFICANT CHANGE UP (ref 4.8–10.8)
WBC # FLD AUTO: 5.01 K/UL — SIGNIFICANT CHANGE UP (ref 4.8–10.8)

## 2019-08-15 RX ADMIN — SIMETHICONE 80 MILLIGRAM(S): 80 TABLET, CHEWABLE ORAL at 02:04

## 2019-08-15 RX ADMIN — Medication 600 MILLIGRAM(S): at 17:08

## 2019-08-15 RX ADMIN — Medication 600 MILLIGRAM(S): at 12:43

## 2019-08-15 RX ADMIN — Medication 600 MILLIGRAM(S): at 05:21

## 2019-08-15 RX ADMIN — Medication 600 MILLIGRAM(S): at 17:09

## 2019-08-15 RX ADMIN — Medication 650 MILLIGRAM(S): at 05:21

## 2019-08-15 RX ADMIN — Medication 650 MILLIGRAM(S): at 19:22

## 2019-08-15 RX ADMIN — SIMETHICONE 80 MILLIGRAM(S): 80 TABLET, CHEWABLE ORAL at 14:17

## 2019-08-15 RX ADMIN — Medication 600 MILLIGRAM(S): at 06:55

## 2019-08-15 RX ADMIN — SIMETHICONE 80 MILLIGRAM(S): 80 TABLET, CHEWABLE ORAL at 05:21

## 2019-08-15 RX ADMIN — SIMETHICONE 80 MILLIGRAM(S): 80 TABLET, CHEWABLE ORAL at 21:14

## 2019-08-15 RX ADMIN — Medication 100 MILLIGRAM(S): at 17:08

## 2019-08-15 RX ADMIN — GABAPENTIN 300 MILLIGRAM(S): 400 CAPSULE ORAL at 21:14

## 2019-08-15 RX ADMIN — Medication 600 MILLIGRAM(S): at 00:46

## 2019-08-15 RX ADMIN — SIMETHICONE 80 MILLIGRAM(S): 80 TABLET, CHEWABLE ORAL at 10:38

## 2019-08-15 RX ADMIN — GABAPENTIN 300 MILLIGRAM(S): 400 CAPSULE ORAL at 05:22

## 2019-08-15 RX ADMIN — ATORVASTATIN CALCIUM 10 MILLIGRAM(S): 80 TABLET, FILM COATED ORAL at 21:14

## 2019-08-15 RX ADMIN — Medication 200 MILLIGRAM(S): at 05:22

## 2019-08-15 RX ADMIN — Medication 650 MILLIGRAM(S): at 13:14

## 2019-08-15 RX ADMIN — Medication 650 MILLIGRAM(S): at 00:46

## 2019-08-15 RX ADMIN — Medication 650 MILLIGRAM(S): at 00:21

## 2019-08-15 RX ADMIN — Medication 100 MILLIGRAM(S): at 05:21

## 2019-08-15 RX ADMIN — Medication 600 MILLIGRAM(S): at 00:21

## 2019-08-15 RX ADMIN — Medication 200 MILLIGRAM(S): at 21:14

## 2019-08-15 RX ADMIN — GABAPENTIN 300 MILLIGRAM(S): 400 CAPSULE ORAL at 14:18

## 2019-08-15 RX ADMIN — Medication 650 MILLIGRAM(S): at 06:55

## 2019-08-15 RX ADMIN — FAMOTIDINE 20 MILLIGRAM(S): 10 INJECTION INTRAVENOUS at 05:21

## 2019-08-15 RX ADMIN — Medication 600 MILLIGRAM(S): at 13:13

## 2019-08-15 RX ADMIN — SIMETHICONE 80 MILLIGRAM(S): 80 TABLET, CHEWABLE ORAL at 17:09

## 2019-08-15 RX ADMIN — Medication 200 MILLIGRAM(S): at 14:18

## 2019-08-15 RX ADMIN — Medication 650 MILLIGRAM(S): at 12:44

## 2019-08-15 RX ADMIN — FAMOTIDINE 20 MILLIGRAM(S): 10 INJECTION INTRAVENOUS at 17:09

## 2019-08-15 NOTE — DISCHARGE NOTE PROVIDER - NSDCCPCAREPLAN_GEN_ALL_CORE_FT
PRINCIPAL DISCHARGE DIAGNOSIS  Diagnosis: Adnexal mass  Assessment and Plan of Treatment: PRINCIPAL DISCHARGE DIAGNOSIS  Diagnosis: Adnexal mass  Assessment and Plan of Treatment: complete staging for ovarian Ca on frozen section, will follow up final pathology, followup with Dr. Mcnally in 2 weeks, and patient will likely see heme/onc as out patient for chemo once we have final diagnosis

## 2019-08-15 NOTE — DISCHARGE NOTE PROVIDER - HOSPITAL COURSE
58 yo P3 with adnexal mass, s/p diag lap, converted to ex lap, LUCINA BSO, omentectomy, pelvic and para aortic lymph node dissection, EBL 400cc, frozen with ovarian CA, with urinary retention postop- stiles replaced and removed 24h later and patient was able to void.    Hemoglobin was noticed to drop, Tranexemix acid 1g was given on 8/14.    Today, patient is POD3, ambulating, voiding, tolerating regular diet, passing flatus.                             9.0      6.43  )-----------( 223      ( 14 Aug 2019 11:19 )               28.7                             9.2      6.61  )-----------( 228      ( 14 Aug 2019 05:55 )               28.9                             10.2     7.46  )-----------( 257      ( 13 Aug 2019 11:21 )               31.1

## 2019-08-15 NOTE — DISCHARGE NOTE PROVIDER - NSDCFUSCHEDAPPT_GEN_ALL_CORE_FT
LEIF JOHNSON ; 08/23/2019 ; [facility] [address] LEIF JOHNSON ; 08/27/2019 ; NPP [facility] [address]

## 2019-08-15 NOTE — PROGRESS NOTE ADULT - SUBJECTIVE AND OBJECTIVE BOX
PGY2 Note    Pt seen and examined at bedside. No overnight events, no new complaints. Recovering well, tolerating regular diet, passing gas, ambulating without difficulty, voiding. Denies dizziness, lightheadedness, fevers, chills, N/V, chest pain, SOB, extremity pain or swelling. Has not had a bowel movement yet.    MEDICATIONS  (STANDING):  acetaminophen   Tablet .. 650 milliGRAM(s) Oral every 6 hours  atorvastatin 10 milliGRAM(s) Oral at bedtime  docusate sodium 100 milliGRAM(s) Oral two times a day  famotidine    Tablet 20 milliGRAM(s) Oral two times a day  gabapentin 300 milliGRAM(s) Oral three times a day  guaiFENesin  milliGRAM(s) Oral every 12 hours  ibuprofen  Tablet. 600 milliGRAM(s) Oral every 6 hours  phenazopyridine 200 milliGRAM(s) Oral three times a day  simethicone 80 milliGRAM(s) Chew every 4 hours    MEDICATIONS  (PRN):  ondansetron Injectable 4 milliGRAM(s) IV Push every 6 hours PRN Nausea and/or Vomiting  oxyCODONE    IR 10 milliGRAM(s) Oral every 6 hours PRN Moderate Pain (4 - 6)      PHYSICAL EXAM:  ICU Vital Signs Last 24 Hrs  T(C): 36.6 (15 Aug 2019 00:37), Max: 36.6 (15 Aug 2019 00:37)  HR: 92 (15 Aug 2019 00:37) (92 - 93)  BP: 102/54 (15 Aug 2019 00:37) (102/54 - 110/53)  RR: 16 (15 Aug 2019 00:37) (16 - 18)    General Appearance: NAD, AA0x3  Cardiac: RRR  Pulmonary: CTAB  Abdomen: soft, nontender, nondistended, no rebound, guarding, or rigidity. Pos BS.  Incision: clean, dry and intact; mild bruising around incision site. Improved from previously  Extremities: no edema or calf tenderness. No erythema    LABS:                                   9.0    6.43  )-----------( 223      ( 14 Aug 2019 11:19 )             28.7                         9.2    6.61  )-----------( 228      ( 14 Aug 2019 05:55 )             28.9                         10.2   7.46  )-----------( 257      ( 13 Aug 2019 11:21 )             31.1     08-14    142  |  103  |  13  ----------------------------<  128<H>  4.1   |  30  |  0.8    Ca    8.4<L>      14 Aug 2019 05:55  Phos  2.0     08-14  Mg     2.1     08-14 PGY2 Note    Pt seen and examined at bedside. No overnight events, no new complaints. Recovering well, tolerating regular diet, passing gas, ambulating without difficulty, voiding. Denies dizziness, lightheadedness, fevers, chills, N/V, chest pain, SOB, extremity pain or swelling. Has not had a bowel movement yet.    x      PHYSICAL EXAM:  ICU Vital Signs Last 24 Hrs  T(C): 36.6 (15 Aug 2019 00:37), Max: 36.6 (15 Aug 2019 00:37)  HR: 92 (15 Aug 2019 00:37) (92 - 93)  BP: 102/54 (15 Aug 2019 00:37) (102/54 - 110/53)  RR: 16 (15 Aug 2019 00:37) (16 - 18)    General Appearance: NAD, AA0x3  Cardiac: RRR  Pulmonary: CTAB  Abdomen: soft, nontender, nondistended, no rebound, guarding, or rigidity. Pos BS.  Incision: clean, dry and intact; mild bruising around incision site. Improved from previously  Extremities: no edema or calf tenderness. No erythema    LABS:                                   9.0    6.43  )-----------( 223      ( 14 Aug 2019 11:19 )             28.7                         9.2    6.61  )-----------( 228      ( 14 Aug 2019 05:55 )             28.9                         10.2   7.46  )-----------( 257      ( 13 Aug 2019 11:21 )             31.1     08-14    142  |  103  |  13  ----------------------------<  128<H>  4.1   |  30  |  0.8    Ca    8.4<L>      14 Aug 2019 05:55  Phos  2.0     08-14  Mg     2.1     08-14        MEDICATIONS  (STANDING):  acetaminophen   Tablet .. 650 milliGRAM(s) Oral every 6 hours  atorvastatin 10 milliGRAM(s) Oral at bedtime  docusate sodium 100 milliGRAM(s) Oral two times a day  famotidine    Tablet 20 milliGRAM(s) Oral two times a day  gabapentin 300 milliGRAM(s) Oral three times a day  guaiFENesin  milliGRAM(s) Oral every 12 hours  ibuprofen  Tablet. 600 milliGRAM(s) Oral every 6 hours  phenazopyridine 200 milliGRAM(s) Oral three times a day  simethicone 80 milliGRAM(s) Chew every 4 hours    MEDICATIONS  (PRN):  ondansetron Injectable 4 milliGRAM(s) IV Push every 6 hours PRN Nausea and/or Vomiting  oxyCODONE    IR 10 milliGRAM(s) Oral every 6 hours PRN Moderate Pain (4 - 6)

## 2019-08-15 NOTE — DISCHARGE NOTE PROVIDER - NSDCFUADDINST_GEN_ALL_CORE_FT
nothing in the vagina for 6 weeks, no intercourse, no tampons, no pools, no douching  Avoid heavy lifting  follow up with Dr. Mcnally in 2 weeks

## 2019-08-15 NOTE — PROGRESS NOTE ADULT - ASSESSMENT
58 yo P3 with adnexal mass, s/p diag lap, converted to ex lap, LUCINA BSO, omentectomy, pelvic and para aortic lymph node dissection, EBL 400cc, frozen with ovarian CA, POD 3, recovering well, with urine retention immediately postop, stiles replaced, urine output adequate, with hemoglobin drop, asymptomatic    ERAS protocol for pain management  Hold lovenox  f/u AM labs  Regular diet  Ambulation and PO hydration encouraged  Incentive spirometry encouraged  reassess     Dr. Daniel and Dr. Mcnally to be made aware 58 yo P3 with adnexal mass, s/p diag lap, converted to ex lap, LUCINA BSO, omentectomy, pelvic and para aortic lymph node dissection, EBL 400cc, frozen with ovarian CA, POD 3, recovering well      Anticipate dc home today  ERAS protocol for pain management  Hold lovenox  f/u AM labs  Regular diet  Ambulation and PO hydration encouraged  Incentive spirometry encouraged  reassess     Dr. Daniel and Dr. Mcnally to be made aware

## 2019-08-15 NOTE — DISCHARGE NOTE PROVIDER - NSDCCPTREATMENT_GEN_ALL_CORE_FT
PRINCIPAL PROCEDURE  Procedure: Exploratory laparotomy with total abdominal hysterectomy and bilateral salpingo-oophorectomy for staging  Findings and Treatment:       SECONDARY PROCEDURE  Procedure: Omentectomy  Findings and Treatment:     Procedure: Open pelvic lymph node dissection  Findings and Treatment:     Procedure: Laparoscopy, diagnostic, with exploratory laparotomy  Findings and Treatment:     Procedure: Open pelvic lymph node dissection  Findings and Treatment:     Procedure: Appendectomy  Findings and Treatment: PRINCIPAL PROCEDURE  Procedure: Exploratory laparotomy with total abdominal hysterectomy and bilateral salpingo-oophorectomy for staging  Findings and Treatment: Nothing in the vagina for 6 weeks. No sex, no tampons, no douching, no tubbaths. Showering allowed.

## 2019-08-16 ENCOUNTER — TRANSCRIPTION ENCOUNTER (OUTPATIENT)
Age: 59
End: 2019-08-16

## 2019-08-16 VITALS
TEMPERATURE: 99 F | SYSTOLIC BLOOD PRESSURE: 120 MMHG | RESPIRATION RATE: 18 BRPM | DIASTOLIC BLOOD PRESSURE: 60 MMHG | HEART RATE: 88 BPM

## 2019-08-16 LAB
BASOPHILS # BLD AUTO: 0.03 K/UL — SIGNIFICANT CHANGE UP (ref 0–0.2)
BASOPHILS NFR BLD AUTO: 0.6 % — SIGNIFICANT CHANGE UP (ref 0–1)
EOSINOPHIL # BLD AUTO: 0.2 K/UL — SIGNIFICANT CHANGE UP (ref 0–0.7)
EOSINOPHIL NFR BLD AUTO: 4 % — SIGNIFICANT CHANGE UP (ref 0–8)
HCT VFR BLD CALC: 28.2 % — LOW (ref 37–47)
HGB BLD-MCNC: 8.8 G/DL — LOW (ref 12–16)
IMM GRANULOCYTES NFR BLD AUTO: 0.6 % — HIGH (ref 0.1–0.3)
LYMPHOCYTES # BLD AUTO: 1.97 K/UL — SIGNIFICANT CHANGE UP (ref 1.2–3.4)
LYMPHOCYTES # BLD AUTO: 39.9 % — SIGNIFICANT CHANGE UP (ref 20.5–51.1)
MCHC RBC-ENTMCNC: 26.8 PG — LOW (ref 27–31)
MCHC RBC-ENTMCNC: 31.2 G/DL — LOW (ref 32–37)
MCV RBC AUTO: 86 FL — SIGNIFICANT CHANGE UP (ref 81–99)
MONOCYTES # BLD AUTO: 0.25 K/UL — SIGNIFICANT CHANGE UP (ref 0.1–0.6)
MONOCYTES NFR BLD AUTO: 5.1 % — SIGNIFICANT CHANGE UP (ref 1.7–9.3)
NEUTROPHILS # BLD AUTO: 2.46 K/UL — SIGNIFICANT CHANGE UP (ref 1.4–6.5)
NEUTROPHILS NFR BLD AUTO: 49.8 % — SIGNIFICANT CHANGE UP (ref 42.2–75.2)
NRBC # BLD: 0 /100 WBCS — SIGNIFICANT CHANGE UP (ref 0–0)
PLATELET # BLD AUTO: 234 K/UL — SIGNIFICANT CHANGE UP (ref 130–400)
RBC # BLD: 3.28 M/UL — LOW (ref 4.2–5.4)
RBC # FLD: 13.3 % — SIGNIFICANT CHANGE UP (ref 11.5–14.5)
SURGICAL PATHOLOGY STUDY: SIGNIFICANT CHANGE UP
WBC # BLD: 4.94 K/UL — SIGNIFICANT CHANGE UP (ref 4.8–10.8)
WBC # FLD AUTO: 4.94 K/UL — SIGNIFICANT CHANGE UP (ref 4.8–10.8)

## 2019-08-16 PROCEDURE — 99255 IP/OBS CONSLTJ NEW/EST HI 80: CPT

## 2019-08-16 RX ORDER — DOCUSATE SODIUM 100 MG
1 CAPSULE ORAL
Qty: 60 | Refills: 0
Start: 2019-08-16

## 2019-08-16 RX ORDER — IBUPROFEN 200 MG
1 TABLET ORAL
Qty: 0 | Refills: 0 | DISCHARGE
Start: 2019-08-16

## 2019-08-16 RX ORDER — FERROUS SULFATE 325(65) MG
1 TABLET ORAL
Qty: 90 | Refills: 1
Start: 2019-08-16

## 2019-08-16 RX ADMIN — GABAPENTIN 300 MILLIGRAM(S): 400 CAPSULE ORAL at 13:45

## 2019-08-16 RX ADMIN — Medication 650 MILLIGRAM(S): at 05:15

## 2019-08-16 RX ADMIN — Medication 200 MILLIGRAM(S): at 13:45

## 2019-08-16 RX ADMIN — FAMOTIDINE 20 MILLIGRAM(S): 10 INJECTION INTRAVENOUS at 05:11

## 2019-08-16 RX ADMIN — Medication 650 MILLIGRAM(S): at 13:43

## 2019-08-16 RX ADMIN — Medication 600 MILLIGRAM(S): at 13:44

## 2019-08-16 RX ADMIN — Medication 100 MILLIGRAM(S): at 05:11

## 2019-08-16 RX ADMIN — Medication 650 MILLIGRAM(S): at 05:11

## 2019-08-16 RX ADMIN — Medication 600 MILLIGRAM(S): at 05:11

## 2019-08-16 RX ADMIN — SIMETHICONE 80 MILLIGRAM(S): 80 TABLET, CHEWABLE ORAL at 13:44

## 2019-08-16 RX ADMIN — Medication 200 MILLIGRAM(S): at 05:11

## 2019-08-16 RX ADMIN — Medication 600 MILLIGRAM(S): at 05:15

## 2019-08-16 RX ADMIN — GABAPENTIN 300 MILLIGRAM(S): 400 CAPSULE ORAL at 05:11

## 2019-08-16 RX ADMIN — Medication 600 MILLIGRAM(S): at 13:46

## 2019-08-16 RX ADMIN — Medication 650 MILLIGRAM(S): at 13:46

## 2019-08-16 RX ADMIN — SIMETHICONE 80 MILLIGRAM(S): 80 TABLET, CHEWABLE ORAL at 05:11

## 2019-08-16 NOTE — CONSULT NOTE ADULT - SUBJECTIVE AND OBJECTIVE BOX
Patient is a 59y old  Female who presents with a chief complaint of LUCINA BSO (15 Aug 2019 06:20)      HPI:       ROS:  Negative except for:    PAST MEDICAL & SURGICAL HISTORY:  Hypercholesteremia  Tooth caries: dental work -local javier onl;y      SOCIAL HISTORY:    FAMILY HISTORY:  FH: HTN (hypertension)  FH: myocardial infarction  FH: diabetes mellitus      MEDICATIONS  (STANDING):  acetaminophen   Tablet .. 650 milliGRAM(s) Oral every 6 hours  atorvastatin 10 milliGRAM(s) Oral at bedtime  docusate sodium 100 milliGRAM(s) Oral two times a day  famotidine    Tablet 20 milliGRAM(s) Oral two times a day  gabapentin 300 milliGRAM(s) Oral three times a day  guaiFENesin  milliGRAM(s) Oral every 12 hours  ibuprofen  Tablet. 600 milliGRAM(s) Oral every 6 hours  phenazopyridine 200 milliGRAM(s) Oral three times a day  simethicone 80 milliGRAM(s) Chew every 4 hours    MEDICATIONS  (PRN):  ondansetron Injectable 4 milliGRAM(s) IV Push every 6 hours PRN Nausea and/or Vomiting  oxyCODONE    IR 10 milliGRAM(s) Oral every 6 hours PRN Moderate Pain (4 - 6)      Allergies    No Known Allergies    Intolerances        Vital Signs Last 24 Hrs  T(C): 36.6 (16 Aug 2019 07:50), Max: 36.7 (15 Aug 2019 15:30)  T(F): 97.8 (16 Aug 2019 07:50), Max: 98.1 (15 Aug 2019 15:30)  HR: 77 (16 Aug 2019 08:09) (77 - 83)  BP: 110/61 (16 Aug 2019 08:09) (103/61 - 132/65)  BP(mean): --  RR: 18 (16 Aug 2019 07:50) (18 - 18)  SpO2: --    PHYSICAL EXAM  General: adult in NAD  HEENT: clear oropharynx, anicteric sclera, pink conjunctiva  Neck: supple  CV: normal S1/S2 with no murmur rubs or gallops  Lungs: positive air movement b/l ant lungs,clear to auscultation, no wheezes, no rales  Abdomen: soft non-tender non-distended, no hepatosplenomegaly  Ext: no clubbing cyanosis or edema  Skin: no rashes and no petechiae  Neuro: alert and oriented X 4, no focal deficits      LABS:                          8.8    4.94  )-----------( 234      ( 16 Aug 2019 06:00 )             28.2         Mean Cell Volume : 86.0 fL  Mean Cell Hemoglobin : 26.8 pg  Mean Cell Hemoglobin Concentration : 31.2 g/dL  Auto Neutrophil # : 2.46 K/uL  Auto Lymphocyte # : 1.97 K/uL  Auto Monocyte # : 0.25 K/uL  Auto Eosinophil # : 0.20 K/uL  Auto Basophil # : 0.03 K/uL  Auto Neutrophil % : 49.8 %  Auto Lymphocyte % : 39.9 %  Auto Monocyte % : 5.1 %  Auto Eosinophil % : 4.0 %  Auto Basophil % : 0.6 %      Serial CBC's  08-16 @ 06:00  Hct-28.2 / Hgb-8.8 / Plat-234 / RBC-3.28 / WBC-4.94  Serial CBC's  08-15 @ 07:21  Hct-27.3 / Hgb-8.7 / Plat-212 / RBC-3.16 / WBC-5.01  Serial CBC's  08-14 @ 11:19  Hct-28.7 / Hgb-9.0 / Plat-223 / RBC-3.33 / WBC-6.43  Serial CBC's  08-14 @ 05:55  Hct-28.9 / Hgb-9.2 / Plat-228 / RBC-3.38 / WBC-6.61  Serial CBC's  08-13 @ 11:21  Hct-31.1 / Hgb-10.2 / Plat-257 / RBC-3.67 / WBC-7.46  Serial CBC's  08-13 @ 06:46  Hct-31.5 / Hgb-10.4 / Plat-262 / RBC-3.76 / WBC-8.22  Serial CBC's  08-12 @ 21:38  Hct-34.6 / Hgb-11.2 / Plat-255 / RBC-4.08 / WBC-10.99              PT/INR - ( 15 Aug 2019 07:21 )   PT: 12.00 sec;   INR: 1.04 ratio         PTT - ( 15 Aug 2019 07:21 )  PTT:28.1 sec                BLOOD SMEAR INTERPRETATION:       RADIOLOGY & ADDITIONAL STUDIES: Patient is a 59y old  Female who presents with a chief complaint of LUCINA BSO (15 Aug 2019 06:20)      HPI: 59 yof, LMP at age 49, h/o hypercholesterolemia originally presented to ED in July 2019 for 2 weeks of yellow vaginal discharge and 1 day of light vaginal bleeding.  She saw Dr. Lockhart in [place] and was found to have a pelvic mass and a possible "swollen tube" according to outpatient sonogram and MRI done in [place].  Patient subsequently saw Dr. Mcnally and was scheduled for laparoscopic LUCINA/BSO, which was converted to exploratory laparotomy secondary to multiple adhesions.  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      ROS:  Negative except for: Complains of continued pain at surgical site.    PAST MEDICAL & SURGICAL HISTORY:  Hypercholesteremia  Tooth caries: dental work -local anasthesia onl;y      SOCIAL HISTORY: Negative x 3    FAMILY HISTORY:  FH: HTN (hypertension)  FH: myocardial infarction  FH: diabetes mellitus      MEDICATIONS  (STANDING):  acetaminophen   Tablet .. 650 milliGRAM(s) Oral every 6 hours  atorvastatin 10 milliGRAM(s) Oral at bedtime  docusate sodium 100 milliGRAM(s) Oral two times a day  famotidine    Tablet 20 milliGRAM(s) Oral two times a day  gabapentin 300 milliGRAM(s) Oral three times a day  guaiFENesin  milliGRAM(s) Oral every 12 hours  ibuprofen  Tablet. 600 milliGRAM(s) Oral every 6 hours  phenazopyridine 200 milliGRAM(s) Oral three times a day  simethicone 80 milliGRAM(s) Chew every 4 hours    MEDICATIONS  (PRN):  ondansetron Injectable 4 milliGRAM(s) IV Push every 6 hours PRN Nausea and/or Vomiting  oxyCODONE    IR 10 milliGRAM(s) Oral every 6 hours PRN Moderate Pain (4 - 6)      Allergies    No Known Allergies    Intolerances        Vital Signs Last 24 Hrs  T(C): 36.6 (16 Aug 2019 07:50), Max: 36.7 (15 Aug 2019 15:30)  T(F): 97.8 (16 Aug 2019 07:50), Max: 98.1 (15 Aug 2019 15:30)  HR: 77 (16 Aug 2019 08:09) (77 - 83)  BP: 110/61 (16 Aug 2019 08:09) (103/61 - 132/65)  BP(mean): --  RR: 18 (16 Aug 2019 07:50) (18 - 18)  SpO2: --    PHYSICAL EXAM  General: adult in NAD, resting comfortably in bed  HEENT: NC/AT  Neck: supple  Lungs: positive air movement b/l ant lungs  Abdomen: + large midline surgical scar, appears to be healing well  Ext: no edema  Skin: no rashes and no petechiae  Neuro: alert and oriented X 4, no focal deficits      LABS:                          8.8    4.94  )-----------( 234      ( 16 Aug 2019 06:00 )             28.2         Mean Cell Volume : 86.0 fL  Mean Cell Hemoglobin : 26.8 pg  Mean Cell Hemoglobin Concentration : 31.2 g/dL  Auto Neutrophil # : 2.46 K/uL  Auto Lymphocyte # : 1.97 K/uL  Auto Monocyte # : 0.25 K/uL  Auto Eosinophil # : 0.20 K/uL  Auto Basophil # : 0.03 K/uL  Auto Neutrophil % : 49.8 %  Auto Lymphocyte % : 39.9 %  Auto Monocyte % : 5.1 %  Auto Eosinophil % : 4.0 %  Auto Basophil % : 0.6 %      Serial CBC's  08-16 @ 06:00  Hct-28.2 / Hgb-8.8 / Plat-234 / RBC-3.28 / WBC-4.94  Serial CBC's  08-15 @ 07:21  Hct-27.3 / Hgb-8.7 / Plat-212 / RBC-3.16 / WBC-5.01  Serial CBC's  08-14 @ 11:19  Hct-28.7 / Hgb-9.0 / Plat-223 / RBC-3.33 / WBC-6.43  Serial CBC's  08-14 @ 05:55  Hct-28.9 / Hgb-9.2 / Plat-228 / RBC-3.38 / WBC-6.61  Serial CBC's  08-13 @ 11:21  Hct-31.1 / Hgb-10.2 / Plat-257 / RBC-3.67 / WBC-7.46  Serial CBC's  08-13 @ 06:46  Hct-31.5 / Hgb-10.4 / Plat-262 / RBC-3.76 / WBC-8.22  Serial CBC's  08-12 @ 21:38  Hct-34.6 / Hgb-11.2 / Plat-255 / RBC-4.08 / WBC-10.99              PT/INR - ( 15 Aug 2019 07:21 )   PT: 12.00 sec;   INR: 1.04 ratio         PTT - ( 15 Aug 2019 07:21 )  PTT:28.1 sec

## 2019-08-16 NOTE — DISCHARGE NOTE NURSING/CASE MANAGEMENT/SOCIAL WORK - NSDCDPATPORTLINK_GEN_ALL_CORE
You can access the [facility] Patient Portal, offered by [facility], by registering with the following website: [email or web address]

## 2019-08-16 NOTE — CONSULT NOTE ADULT - ASSESSMENT
59 yof, LMP at age 49, h/o hypercholesterolemia originally presented to ED in July 2019 for 2 weeks of yellow vaginal discharge and 1 day of light vaginal bleeding.  She saw Dr. Lockhart in [place] and was found to have a pelvic mass and a possible "swollen tube" according to outpatient sonogram and MRI done in [place].  Patient subsequently saw Dr. Mcnally and was scheduled for laparoscopic LUCINA/BSO, which was converted to exploratory laparotomy secondary to multiple adhesions.  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    1. Newly diagnosed ovarian cancer-Await final pathology results for definitive treatment planning.  Recommend CT chest and abdomen (has recent MRI pelvis) to complete staging and to further evaluate elevated Ca 19-9.  CT scans may be performed as outpatient in order to decrease extent of postsurgical changes seen.  Will also discuss genetic testing as outpatient.  2. Acute normocytic anemia-Likely secondary to post-operative blood loss, since patient had hemoglobin of 13.1 prior to surgery.  No signs/symptoms of current active bleeding.  Check iron studies.  Patient may benefit from oral or IV iron.  3. Follow up with Dr. Herman on 9/5/2019 at 2:00 PM.

## 2019-08-16 NOTE — PROGRESS NOTE ADULT - SUBJECTIVE AND OBJECTIVE BOX
PGY2 Note    Pt seen and examined at bedside. No overnight events, no new complaints. Recovering well, tolerating regular diet, passing gas, ambulating without difficulty, voiding. Denies dizziness, lightheadedness, fevers, chills, N/V, chest pain, SOB, extremity pain or swelling. Has not had a bowel movement yet.        PHYSICAL EXAM:    Vital Signs Last 24 Hrs  T(F): 96.1 (16 Aug 2019 00:00), Max: 98.1 (15 Aug 2019 15:30)  HR: 80 (16 Aug 2019 00:00) (80 - 83)  BP: 108/63 (16 Aug 2019 00:00) (103/61 - 115/62)  RR: 18 (16 Aug 2019 00:00) (18 - 18)    General Appearance: NAD, AAOx3  Cardiac: RRR  Pulmonary: CTAB  Abdomen: soft, nontender, nondistended, no rebound, guarding, or rigidity. Pos BS.  Incision: clean, dry and intact; improved bruising at incision site  Extremities: no swelling or calf tenderness. no erythema    LABS:                                      8.7    5.01  )-----------( 212      ( 15 Aug 2019 07:21 )             27.3                         9.0    6.43  )-----------( 223      ( 14 Aug 2019 11:19 )             28.7                         9.2    6.61  )-----------( 228      ( 14 Aug 2019 05:55 )             28.9       PT/INR - ( 15 Aug 2019 07:21 )   PT: 12.00 sec;   INR: 1.04 ratio         PTT - ( 15 Aug 2019 07:21 )  PTT:28.1 sec    MEDICATIONS  (STANDING):  acetaminophen   Tablet .. 650 milliGRAM(s) Oral every 6 hours  atorvastatin 10 milliGRAM(s) Oral at bedtime  docusate sodium 100 milliGRAM(s) Oral two times a day  famotidine    Tablet 20 milliGRAM(s) Oral two times a day  gabapentin 300 milliGRAM(s) Oral three times a day  guaiFENesin  milliGRAM(s) Oral every 12 hours  ibuprofen  Tablet. 600 milliGRAM(s) Oral every 6 hours  phenazopyridine 200 milliGRAM(s) Oral three times a day  simethicone 80 milliGRAM(s) Chew every 4 hours    MEDICATIONS  (PRN):  ondansetron Injectable 4 milliGRAM(s) IV Push every 6 hours PRN Nausea and/or Vomiting  oxyCODONE    IR 10 milliGRAM(s) Oral every 6 hours PRN Moderate Pain (4 - 6)

## 2019-08-16 NOTE — PROGRESS NOTE ADULT - ASSESSMENT
60 yo P3 with adnexal mass, s/p diag lap, converted to ex lap, LUCINA BSO, omentectomy, pelvic and para aortic lymph node dissection, EBL 400cc, frozen with ovarian CA, POD 4, recovering well        ERAS protocol for pain management  Hold lovenox  f/u AM labs  Regular diet  Ambulation and PO hydration encouraged  Incentive spirometry encouraged  reassess     Dr. Daniel and Dr. Mcnally to be made aware 58 yo P3 with adnexal mass, s/p diag lap, converted to ex lap, ULCINA BSO, omentectomy, pelvic and para aortic lymph node dissection, EBL 400cc, frozen with ovarian CA, POD 4, recovering well        ERAS protocol for pain management  Hold lovenox  f/u AM labs  Regular diet  Ambulation and PO hydration encouraged  Incentive spirometry encouraged  f/u pathology    Dr. Daniel aware   Dr. Mcnally to be made aware

## 2019-08-23 ENCOUNTER — APPOINTMENT (OUTPATIENT)
Dept: GYNECOLOGIC ONCOLOGY | Facility: CLINIC | Age: 59
End: 2019-08-23

## 2019-08-23 ENCOUNTER — FORM ENCOUNTER (OUTPATIENT)
Age: 59
End: 2019-08-23

## 2019-08-24 ENCOUNTER — OUTPATIENT (OUTPATIENT)
Dept: OUTPATIENT SERVICES | Facility: HOSPITAL | Age: 59
LOS: 1 days | Discharge: HOME | End: 2019-08-24
Payer: COMMERCIAL

## 2019-08-24 DIAGNOSIS — C56.9 MALIGNANT NEOPLASM OF UNSPECIFIED OVARY: ICD-10-CM

## 2019-08-24 DIAGNOSIS — K02.9 DENTAL CARIES, UNSPECIFIED: Chronic | ICD-10-CM

## 2019-08-24 PROCEDURE — 74177 CT ABD & PELVIS W/CONTRAST: CPT | Mod: 26

## 2019-08-27 ENCOUNTER — APPOINTMENT (OUTPATIENT)
Dept: GYNECOLOGIC ONCOLOGY | Facility: CLINIC | Age: 59
End: 2019-08-27
Payer: COMMERCIAL

## 2019-08-27 VITALS
HEIGHT: 59 IN | DIASTOLIC BLOOD PRESSURE: 74 MMHG | BODY MASS INDEX: 29.84 KG/M2 | TEMPERATURE: 98 F | SYSTOLIC BLOOD PRESSURE: 122 MMHG | WEIGHT: 148 LBS

## 2019-08-27 VITALS
SYSTOLIC BLOOD PRESSURE: 122 MMHG | WEIGHT: 150 LBS | TEMPERATURE: 98 F | DIASTOLIC BLOOD PRESSURE: 74 MMHG | BODY MASS INDEX: 30.24 KG/M2 | HEIGHT: 59 IN

## 2019-08-27 DIAGNOSIS — N70.11 CHRONIC SALPINGITIS: ICD-10-CM

## 2019-08-27 DIAGNOSIS — Z86.39 PERSONAL HISTORY OF OTHER ENDOCRINE, NUTRITIONAL AND METABOLIC DISEASE: ICD-10-CM

## 2019-08-27 DIAGNOSIS — R97.8 OTHER ABNORMAL TUMOR MARKERS: ICD-10-CM

## 2019-08-27 DIAGNOSIS — Z87.42 PERSONAL HISTORY OF OTHER DISEASES OF THE FEMALE GENITAL TRACT: ICD-10-CM

## 2019-08-27 PROCEDURE — 99024 POSTOP FOLLOW-UP VISIT: CPT

## 2019-08-27 NOTE — ASSESSMENT
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 Patient appears to be doing well postop, adjuvant chemotherapy discussed and recommended. The patient will be setting up an appointment with Dr. Bañuelos for further management. She is to return for followup in 2 months.

## 2019-08-27 NOTE — REASON FOR VISIT
[Spouse] : spouse [de-identified] : august 12. 2019 [de-identified] : LUCINA/BSO surgical staging for Ovarian Cancer. [de-identified] : post op check and discussion.

## 2019-08-30 ENCOUNTER — TRANSCRIPTION ENCOUNTER (OUTPATIENT)
Age: 59
End: 2019-08-30

## 2019-09-05 ENCOUNTER — APPOINTMENT (OUTPATIENT)
Dept: HEMATOLOGY ONCOLOGY | Facility: CLINIC | Age: 59
End: 2019-09-05

## 2019-09-05 ENCOUNTER — LABORATORY RESULT (OUTPATIENT)
Age: 59
End: 2019-09-05

## 2019-09-05 ENCOUNTER — APPOINTMENT (OUTPATIENT)
Dept: HEMATOLOGY ONCOLOGY | Facility: CLINIC | Age: 59
End: 2019-09-05
Payer: COMMERCIAL

## 2019-09-05 VITALS
DIASTOLIC BLOOD PRESSURE: 68 MMHG | SYSTOLIC BLOOD PRESSURE: 143 MMHG | BODY MASS INDEX: 29.84 KG/M2 | RESPIRATION RATE: 18 BRPM | HEIGHT: 59 IN | WEIGHT: 148 LBS | HEART RATE: 81 BPM

## 2019-09-05 DIAGNOSIS — Z83.3 FAMILY HISTORY OF DIABETES MELLITUS: ICD-10-CM

## 2019-09-05 DIAGNOSIS — Z82.49 FAMILY HISTORY OF ISCHEMIC HEART DISEASE AND OTHER DISEASES OF THE CIRCULATORY SYSTEM: ICD-10-CM

## 2019-09-05 PROCEDURE — 99215 OFFICE O/P EST HI 40 MIN: CPT

## 2019-09-06 PROBLEM — Z82.49 FAMILY HISTORY OF MYOCARDIAL INFARCTION: Status: ACTIVE | Noted: 2019-09-06

## 2019-09-06 PROBLEM — Z82.49 FAMILY HISTORY OF HYPERTENSION: Status: ACTIVE | Noted: 2019-09-06

## 2019-09-06 PROBLEM — Z83.3 FAMILY HISTORY OF DIABETES MELLITUS: Status: ACTIVE | Noted: 2019-09-06

## 2019-09-06 LAB
CANCER AG125 SERPL-ACNC: 20 U/ML
FERRITIN SERPL-MCNC: 116 NG/ML
FOLATE SERPL-MCNC: >20 NG/ML
HCT VFR BLD CALC: 35.5 %
HGB BLD-MCNC: 11.4 G/DL
IRON SATN MFR SERPL: 17 %
IRON SERPL-MCNC: 51 UG/DL
MCHC RBC-ENTMCNC: 27.4 PG
MCHC RBC-ENTMCNC: 32.1 G/DL
MCV RBC AUTO: 85.3 FL
PLATELET # BLD AUTO: 346 K/UL
PMV BLD: 9.8 FL
RBC # BLD: 4.16 M/UL
RBC # FLD: 13.4 %
TIBC SERPL-MCNC: 292 UG/DL
UIBC SERPL-MCNC: 241 UG/DL
VIT B12 SERPL-MCNC: 664 PG/ML
WBC # FLD AUTO: 4.89 K/UL

## 2019-09-09 NOTE — ASSESSMENT
[FreeTextEntry1] :  In summary this is a 59 year old woman with a diagnosis of Stage 1C2 endometrioid adenocarcinoma of ovary s/p LUCINA/BSO with no residual disease.\par \par  I discussed the natural history, prognosis and treatment of early stage ovarian cancer with the patient and her family.  I reviewed the pathology report with them in detail.\par \par She was offered chemotherapy with carboplatin and Taxol every 3 weeks for 3-6 cycles.\par \par Side effects of chemotherapy including, but not limited to, nausea, vomiting, myelosuppression, risk of infection, neuropathy, myalgia, alopecia and allergic reactions were discussed.  Prescriptions for Zofran and Compazine were provided.\par \par  labs have been ordered.\par W/U for anemia sent\par BRCA testing ordered.\par Chemotherapy will be started next week.\par Pt had several questions which were answered in detail.\par \par

## 2019-09-09 NOTE — HISTORY OF PRESENT ILLNESS
[Disease: _____________________] : Disease: [unfilled] [T: ___] : T[unfilled] [N: ___] : N[unfilled] [M: ___] : M[unfilled] [AJCC Stage: ____] : AJCC Stage: [unfilled] [de-identified] : 59 yof, LMP at age 49, h/o hypercholesterolemia originally presented to ED in July 2019 for 2 weeks of yellow vaginal discharge and 1 day of light vaginal bleeding.  She saw Dr. Lockhart in [place] and was found to have a pelvic mass and a possible "swollen tube" according to outpatient sonogram and MRI done in [place].  Patient subsequently saw Dr. Mcnally and was scheduled for laparoscopic LUCINA/BSO, which was converted to exploratory laparotomy secondary to multiple adhesions.  \par \par Work up\par Complete Pelvic Sonogram (7/26/2019): The uterus measures 7.4 x 5.6 x 3.8 cm, with normal echogenicity \par and morphology. The endometrial echo complex measures 0.3 cm, which is normal in thickness. A very trace amount of endometrial fluid is seen. Within the right adnexa, extending past the midline, a part-cystic, heterogeneous mass with internal vascularity measures 12.4 x 11.2 x 7.2 cm without a distinguishable native ovary. An associated dilated tubular structure is seen adjacent to this mass, possibly representing hydrosalpinx. Nonspecific nonvisualization of the left ovary. No free fluid in the pelvis.\par \par MRI (6/4/2019): midline complex cystic and solid enhancing lesion likely originating from the ovary/ovaries concerning for cystic ovarian neoplasm. Suspicion of left-sided hydrosalpinx, small intrauterine fibroids.\par \par 7/26/2019:\par CEA 7.2\par CA 19-9 693 \par CA-125 88\par \par  \par Diag lap: LUCINA/BSO 8/12/19\par FINDINGS\par large left adnexal mass, around 6cm in size, friable, large right adnexal mass around 8 cm in size, no normal ovary identified bilaterally, right mass completely adherent to right pelvic side wall, unable to dissect it off safely, unable to identify IP ligament on right side.\par  Post surgery: no gross residual\par \par PATHOLOGY\par 1. Uterus, cervix, bilateral tubes and ovaries, total\par hysterectomy and bilateral salpingo-oophorectomy:\par - Endometrioid adenocarcinoma, of the right ovary, 7 cm,\par moderately differentiated, involving the ovarian surface.\par - Left fallopian tube, involved by carcinoma.\par - Right fallopian tube and paratubal tissue with endometriosis;\par no carcinoma seen.\par - Left ovary with foci of endometriosis; no carcinoma seen.\par - Uterine corpus with leiomyoma and adenomyosis; no carcinoma\par seen.\par - Cervix with Nabothian cysts and mild chronic cervicitis; no\par carcinoma seen.\par - Pathologic staging: pT1c2, pN0, pMx\par  [de-identified] : Endometrioid [de-identified] : Normal nuclear expression of all proteins is seen in tumor cell\par nuclei.  There is no evidence of mismatch repair deficiency by\par immunohistochemical evaluation.\par Final Diagnosis\par 1. Uterus, cervix, bilateral tubes and ovaries, total\par hysterectomy and bilateral salpingo-oophorectomy:\par - Endometrioid adenocarcinoma, of the right ovary, 7 cm,\par moderately differentiated, involving the ovarian surface.\par - Left fallopian tube, involved by carcinoma.\par - Right fallopian tube and paratubal tissue with endometriosis;\par no carcinoma seen.\par - Left ovary with foci of endometriosis; no carcinoma seen.\par - Uterine corpus with leiomyoma and adenomyosis; no carcinoma\par seen.\par - Cervix with Nabothian cysts and mild chronic cervicitis; no\par carcinoma seen.\par - Pathologic staging: pT1c2, pN0, pMx\par \par 2. Omentum tissue, omentectomy:\par -  Fibroadipose tissue; no carcinoma seen.\par \par 3. Appendix, appendectomy:\par - Appendix with acute periappendicitis; no carcinoma seen.\par \par 4. Lymph node, right para-aortic, dissection:\par - Three lymph nodes, no carcinoma seen (0/3).\par \par 5. Lymph node, left common iliac, dissection:\par -  Nine lymph nodes, no carcinoma seen (0/9).\par \par 6. Lymph node, left pelvic, dissection:\par -  Twelve lymph nodes, no carcinoma seen (0/12).\par \par 7. Lymph node, left , dissection:\par - Six lymph nodes, no carcinoma seen (0/6).\par  [de-identified] : Pt has had an uneventful post op course.\par No abdominal pain, bowel abnormalities.\par Appetite is improving.\par No vaginal bleeding or discharge

## 2019-09-09 NOTE — PHYSICAL EXAM
[Fully active, able to carry on all pre-disease performance without restriction] : Status 0 - Fully active, able to carry on all pre-disease performance without restriction [Normal] : grossly intact [de-identified] : well healed midline infraumbilical scar

## 2019-09-09 NOTE — REVIEW OF SYSTEMS
[Negative] : Allergic/Immunologic [FreeTextEntry2] : Never had colonoscopy , last mammogram was in 2018

## 2019-09-09 NOTE — RESULTS/DATA
[FreeTextEntry1] : CT abdomen /pelvis \par 8/24/19\par IMPRESSION: \par Above fluid density structures anterior to the left psoas muscle and in the region of the iliac vein confluence. \par These may reflect lymphoceles/sequela of prior surgery as well as tumor recurrence. Further evaluation with \par prior exams or PET CT may be of benefit. \par Ill-defined sclerotic lesion in the T9 vertebra. Correlation with prior images or bone scan may be of benefit.

## 2019-09-13 ENCOUNTER — APPOINTMENT (OUTPATIENT)
Dept: INFUSION THERAPY | Facility: CLINIC | Age: 59
End: 2019-09-13

## 2019-09-13 ENCOUNTER — LABORATORY RESULT (OUTPATIENT)
Age: 59
End: 2019-09-13

## 2019-09-13 DIAGNOSIS — Z00.00 ENCOUNTER FOR GENERAL ADULT MEDICAL EXAMINATION W/OUT ABNORMAL FINDINGS: ICD-10-CM

## 2019-09-13 LAB
ALBUMIN SERPL ELPH-MCNC: 4.4 G/DL
ALP BLD-CCNC: 82 U/L
ALT SERPL-CCNC: 13 U/L
ANION GAP SERPL CALC-SCNC: 13 MMOL/L
AST SERPL-CCNC: 15 U/L
BILIRUB SERPL-MCNC: 0.3 MG/DL
BUN SERPL-MCNC: 12 MG/DL
CALCIUM SERPL-MCNC: 9.6 MG/DL
CHLORIDE SERPL-SCNC: 105 MMOL/L
CO2 SERPL-SCNC: 24 MMOL/L
CREAT SERPL-MCNC: 0.6 MG/DL
GLUCOSE SERPL-MCNC: 101 MG/DL
HCT VFR BLD CALC: 37.3 %
HGB BLD-MCNC: 11.6 G/DL
MCHC RBC-ENTMCNC: 26.9 PG
MCHC RBC-ENTMCNC: 31.1 G/DL
MCV RBC AUTO: 86.3 FL
PLATELET # BLD AUTO: 254 K/UL
PMV BLD: 10.3 FL
POTASSIUM SERPL-SCNC: 4.1 MMOL/L
PROT SERPL-MCNC: 6.9 G/DL
RBC # BLD: 4.32 M/UL
RBC # FLD: 13.2 %
SODIUM SERPL-SCNC: 142 MMOL/L
WBC # FLD AUTO: 4.45 K/UL

## 2019-09-13 RX ORDER — FAMOTIDINE 10 MG/ML
20 INJECTION INTRAVENOUS ONCE
Refills: 0 | Status: COMPLETED | OUTPATIENT
Start: 2019-09-13 | End: 2019-09-13

## 2019-09-13 RX ORDER — FOSAPREPITANT DIMEGLUMINE 150 MG/5ML
150 INJECTION, POWDER, LYOPHILIZED, FOR SOLUTION INTRAVENOUS ONCE
Refills: 0 | Status: COMPLETED | OUTPATIENT
Start: 2019-09-13 | End: 2019-09-13

## 2019-09-13 RX ORDER — PACLITAXEL 6 MG/ML
285 INJECTION, SOLUTION, CONCENTRATE INTRAVENOUS ONCE
Refills: 0 | Status: COMPLETED | OUTPATIENT
Start: 2019-09-13 | End: 2019-09-13

## 2019-09-13 RX ORDER — CARBOPLATIN 50 MG
625 VIAL (EA) INTRAVENOUS ONCE
Refills: 0 | Status: COMPLETED | OUTPATIENT
Start: 2019-09-13 | End: 2019-09-13

## 2019-09-13 RX ORDER — DEXAMETHASONE 0.5 MG/5ML
20 ELIXIR ORAL ONCE
Refills: 0 | Status: COMPLETED | OUTPATIENT
Start: 2019-09-13 | End: 2019-09-13

## 2019-09-13 RX ORDER — DIPHENHYDRAMINE HCL 50 MG
50 CAPSULE ORAL ONCE
Refills: 0 | Status: COMPLETED | OUTPATIENT
Start: 2019-09-13 | End: 2019-09-13

## 2019-09-13 RX ADMIN — Medication 153 MILLIGRAM(S): at 11:20

## 2019-09-13 RX ADMIN — FOSAPREPITANT DIMEGLUMINE 150 MILLIGRAM(S): 150 INJECTION, POWDER, LYOPHILIZED, FOR SOLUTION INTRAVENOUS at 10:47

## 2019-09-13 RX ADMIN — PACLITAXEL 285 MILLIGRAM(S): 6 INJECTION, SOLUTION, CONCENTRATE INTRAVENOUS at 15:50

## 2019-09-13 RX ADMIN — PACLITAXEL 182.5 MILLIGRAM(S): 6 INJECTION, SOLUTION, CONCENTRATE INTRAVENOUS at 12:15

## 2019-09-13 RX ADMIN — FOSAPREPITANT DIMEGLUMINE 450 MILLIGRAM(S): 150 INJECTION, POWDER, LYOPHILIZED, FOR SOLUTION INTRAVENOUS at 11:40

## 2019-09-13 RX ADMIN — Medication 189 MILLIGRAM(S): at 10:39

## 2019-09-13 RX ADMIN — FAMOTIDINE 20 MILLIGRAM(S): 10 INJECTION INTRAVENOUS at 11:20

## 2019-09-13 RX ADMIN — Medication 20 MILLIGRAM(S): at 11:00

## 2019-09-13 RX ADMIN — Medication 312.5 MILLIGRAM(S): at 15:55

## 2019-09-13 RX ADMIN — FAMOTIDINE 156 MILLIGRAM(S): 10 INJECTION INTRAVENOUS at 11:00

## 2019-09-13 RX ADMIN — Medication 50 MILLIGRAM(S): at 11:40

## 2019-09-16 ENCOUNTER — RX RENEWAL (OUTPATIENT)
Age: 59
End: 2019-09-16

## 2019-10-03 ENCOUNTER — LABORATORY RESULT (OUTPATIENT)
Age: 59
End: 2019-10-03

## 2019-10-03 ENCOUNTER — APPOINTMENT (OUTPATIENT)
Dept: INFUSION THERAPY | Facility: CLINIC | Age: 59
End: 2019-10-03

## 2019-10-03 ENCOUNTER — APPOINTMENT (OUTPATIENT)
Dept: HEMATOLOGY ONCOLOGY | Facility: CLINIC | Age: 59
End: 2019-10-03
Payer: COMMERCIAL

## 2019-10-03 VITALS
HEIGHT: 59 IN | DIASTOLIC BLOOD PRESSURE: 72 MMHG | HEART RATE: 76 BPM | TEMPERATURE: 99.1 F | SYSTOLIC BLOOD PRESSURE: 133 MMHG | BODY MASS INDEX: 29.64 KG/M2 | WEIGHT: 147 LBS

## 2019-10-03 LAB
HCT VFR BLD CALC: 37.8 %
HGB BLD-MCNC: 12.2 G/DL
MCHC RBC-ENTMCNC: 27.2 PG
MCHC RBC-ENTMCNC: 32.3 G/DL
MCV RBC AUTO: 84.4 FL
PLATELET # BLD AUTO: 300 K/UL
PMV BLD: 10.1 FL
RBC # BLD: 4.48 M/UL
RBC # FLD: 13.3 %
WBC # FLD AUTO: 4.31 K/UL

## 2019-10-03 PROCEDURE — 99215 OFFICE O/P EST HI 40 MIN: CPT

## 2019-10-03 RX ORDER — DIPHENHYDRAMINE HCL 50 MG
50 CAPSULE ORAL ONCE
Refills: 0 | Status: COMPLETED | OUTPATIENT
Start: 2019-10-03 | End: 2019-10-03

## 2019-10-03 RX ORDER — PACLITAXEL 6 MG/ML
285 INJECTION, SOLUTION, CONCENTRATE INTRAVENOUS ONCE
Refills: 0 | Status: COMPLETED | OUTPATIENT
Start: 2019-10-03 | End: 2019-10-03

## 2019-10-03 RX ORDER — DEXAMETHASONE 0.5 MG/5ML
20 ELIXIR ORAL ONCE
Refills: 0 | Status: COMPLETED | OUTPATIENT
Start: 2019-10-03 | End: 2019-10-03

## 2019-10-03 RX ORDER — CARBOPLATIN 50 MG
625 VIAL (EA) INTRAVENOUS ONCE
Refills: 0 | Status: COMPLETED | OUTPATIENT
Start: 2019-10-03 | End: 2019-10-03

## 2019-10-03 RX ORDER — FOSAPREPITANT DIMEGLUMINE 150 MG/5ML
150 INJECTION, POWDER, LYOPHILIZED, FOR SOLUTION INTRAVENOUS ONCE
Refills: 0 | Status: COMPLETED | OUTPATIENT
Start: 2019-10-03 | End: 2019-10-03

## 2019-10-03 RX ORDER — FAMOTIDINE 10 MG/ML
20 INJECTION INTRAVENOUS ONCE
Refills: 0 | Status: COMPLETED | OUTPATIENT
Start: 2019-10-03 | End: 2019-10-03

## 2019-10-03 RX ADMIN — FAMOTIDINE 20 MILLIGRAM(S): 10 INJECTION INTRAVENOUS at 13:10

## 2019-10-03 RX ADMIN — FAMOTIDINE 156 MILLIGRAM(S): 10 INJECTION INTRAVENOUS at 12:55

## 2019-10-03 RX ADMIN — Medication 20 MILLIGRAM(S): at 12:55

## 2019-10-03 RX ADMIN — Medication 50 MILLIGRAM(S): at 12:10

## 2019-10-03 RX ADMIN — PACLITAXEL 285 MILLIGRAM(S): 6 INJECTION, SOLUTION, CONCENTRATE INTRAVENOUS at 16:30

## 2019-10-03 RX ADMIN — FOSAPREPITANT DIMEGLUMINE 150 MILLIGRAM(S): 150 INJECTION, POWDER, LYOPHILIZED, FOR SOLUTION INTRAVENOUS at 12:35

## 2019-10-03 RX ADMIN — Medication 312.5 MILLIGRAM(S): at 16:30

## 2019-10-03 RX ADMIN — Medication 189 MILLIGRAM(S): at 12:40

## 2019-10-03 RX ADMIN — Medication 625 MILLIGRAM(S): at 17:30

## 2019-10-03 RX ADMIN — Medication 153 MILLIGRAM(S): at 11:55

## 2019-10-03 RX ADMIN — PACLITAXEL 182.5 MILLIGRAM(S): 6 INJECTION, SOLUTION, CONCENTRATE INTRAVENOUS at 13:10

## 2019-10-03 RX ADMIN — FOSAPREPITANT DIMEGLUMINE 450 MILLIGRAM(S): 150 INJECTION, POWDER, LYOPHILIZED, FOR SOLUTION INTRAVENOUS at 12:15

## 2019-10-03 NOTE — CONSULT LETTER
[Dear  ___] : Dear  [unfilled], [Consult Letter:] : I had the pleasure of evaluating your patient, [unfilled]. [Please see my note below.] : Please see my note below. [Consult Closing:] : Thank you very much for allowing me to participate in the care of this patient.  If you have any questions, please do not hesitate to contact me. [Sincerely,] : Sincerely, [DrJeffrey  ___] : Dr. WILBURN [FreeTextEntry3] : Vanessa Herman MD

## 2019-10-03 NOTE — ASSESSMENT
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  \par The chemotherapy dose was adjusted according to pt's height, weight, labs and anticipated tolerance.\par The high risk of complications and complexity associated with antineoplastic therapy administration has been explained to the pt and family.\par Chemotherapy will be administered under my direct supervision\par \par \par  labs have been ordered.\par Previous labs discussed.\par \par BRCA testing was negative. I had a detailed discussion with the pt regarding results of genetic testing.\par The implications of the pt's diagnosis of ovarian cancer towards the risk in her daughters was discussed.\par The daughter was advised to discuss with her GYN regarding this history.\par \par Pt had several questions which were answered in detail.\par \par

## 2019-10-03 NOTE — PHYSICAL EXAM
[Fully active, able to carry on all pre-disease performance without restriction] : Status 0 - Fully active, able to carry on all pre-disease performance without restriction [Normal] : affect appropriate [de-identified] : well healed midline infraumbilical scar, mild thickening in the lower part of the scar.

## 2019-10-03 NOTE — HISTORY OF PRESENT ILLNESS
[Disease: _____________________] : Disease: [unfilled] [T: ___] : T[unfilled] [N: ___] : N[unfilled] [M: ___] : M[unfilled] [AJCC Stage: ____] : AJCC Stage: [unfilled] [de-identified] : 59 yof, LMP at age 49, h/o hypercholesterolemia originally presented to ED in July 2019 for 2 weeks of yellow vaginal discharge and 1 day of light vaginal bleeding.  She saw Dr. Lockhart in [place] and was found to have a pelvic mass and a possible "swollen tube" according to outpatient sonogram and MRI done in [place].  Patient subsequently saw Dr. Mcnally and was scheduled for laparoscopic LUCINA/BSO, which was converted to exploratory laparotomy secondary to multiple adhesions.  \par \par Work up\par Complete Pelvic Sonogram (7/26/2019): The uterus measures 7.4 x 5.6 x 3.8 cm, with normal echogenicity \par and morphology. The endometrial echo complex measures 0.3 cm, which is normal in thickness. A very trace amount of endometrial fluid is seen. Within the right adnexa, extending past the midline, a part-cystic, heterogeneous mass with internal vascularity measures 12.4 x 11.2 x 7.2 cm without a distinguishable native ovary. An associated dilated tubular structure is seen adjacent to this mass, possibly representing hydrosalpinx. Nonspecific nonvisualization of the left ovary. No free fluid in the pelvis.\par \par MRI (6/4/2019): midline complex cystic and solid enhancing lesion likely originating from the ovary/ovaries concerning for cystic ovarian neoplasm. Suspicion of left-sided hydrosalpinx, small intrauterine fibroids.\par \par 7/26/2019:\par CEA 7.2\par CA 19-9 693 \par CA-125 88\par \par  \par Diag lap: LUCINA/BSO 8/12/19\par FINDINGS\par large left adnexal mass, around 6cm in size, friable, large right adnexal mass around 8 cm in size, no normal ovary identified bilaterally, right mass completely adherent to right pelvic side wall, unable to dissect it off safely, unable to identify IP ligament on right side.\par  Post surgery: no gross residual\par \par PATHOLOGY\par 1. Uterus, cervix, bilateral tubes and ovaries, total\par hysterectomy and bilateral salpingo-oophorectomy:\par - Endometrioid adenocarcinoma, of the right ovary, 7 cm,\par moderately differentiated, involving the ovarian surface.\par - Left fallopian tube, involved by carcinoma.\par - Right fallopian tube and paratubal tissue with endometriosis;\par no carcinoma seen.\par - Left ovary with foci of endometriosis; no carcinoma seen.\par - Uterine corpus with leiomyoma and adenomyosis; no carcinoma\par seen.\par - Cervix with Nabothian cysts and mild chronic cervicitis; no\par carcinoma seen.\par - Pathologic staging: pT1c2, pN0, pMx\par \par Pt was started on adjuvant chemotherapy on 9/11/19 with Carboplatin and Taxol\par  [de-identified] : Endometrioid [de-identified] : Normal nuclear expression of all proteins is seen in tumor cell\par nuclei.  There is no evidence of mismatch repair deficiency by\par immunohistochemical evaluation.\par Final Diagnosis\par 1. Uterus, cervix, bilateral tubes and ovaries, total\par hysterectomy and bilateral salpingo-oophorectomy:\par - Endometrioid adenocarcinoma, of the right ovary, 7 cm,\par moderately differentiated, involving the ovarian surface.\par - Left fallopian tube, involved by carcinoma.\par - Right fallopian tube and paratubal tissue with endometriosis;\par no carcinoma seen.\par - Left ovary with foci of endometriosis; no carcinoma seen.\par - Uterine corpus with leiomyoma and adenomyosis; no carcinoma\par seen.\par - Cervix with Nabothian cysts and mild chronic cervicitis; no\par carcinoma seen.\par - Pathologic staging: pT1c2, pN0, pMx\par \par 2. Omentum tissue, omentectomy:\par -  Fibroadipose tissue; no carcinoma seen.\par \par 3. Appendix, appendectomy:\par - Appendix with acute periappendicitis; no carcinoma seen.\par \par 4. Lymph node, right para-aortic, dissection:\par - Three lymph nodes, no carcinoma seen (0/3).\par \par 5. Lymph node, left common iliac, dissection:\par -  Nine lymph nodes, no carcinoma seen (0/9).\par \par 6. Lymph node, left pelvic, dissection:\par -  Twelve lymph nodes, no carcinoma seen (0/12).\par \par 7. Lymph node, left , dissection:\par - Six lymph nodes, no carcinoma seen (0/6).\par  [de-identified] : Pt has had an uneventful post op course.\par No abdominal pain, bowel abnormalities.\par Appetite is improving.\par No vaginal bleeding or discharge\par \par 10/3/19\par Pt is here for follow up.\par She is s/p 1 cycle of Carboplatin/Taxol\par She took nausea meds for 2-3 days, although she was not nauseous.\par She however was constipated x 4 days, which was relieved with Colace and Dulcolax.\par No vaginal bleeding.\par Has twinges of pain in RUQ and at the site of surgery.\par no fever or mouth sores.\par

## 2019-10-04 ENCOUNTER — APPOINTMENT (OUTPATIENT)
Dept: GYNECOLOGIC ONCOLOGY | Facility: CLINIC | Age: 59
End: 2019-10-04
Payer: COMMERCIAL

## 2019-10-04 VITALS
WEIGHT: 147 LBS | TEMPERATURE: 98.6 F | HEIGHT: 59 IN | SYSTOLIC BLOOD PRESSURE: 128 MMHG | BODY MASS INDEX: 29.64 KG/M2 | DIASTOLIC BLOOD PRESSURE: 78 MMHG

## 2019-10-04 DIAGNOSIS — Z85.43 PERSONAL HISTORY OF MALIGNANT NEOPLASM OF OVARY: ICD-10-CM

## 2019-10-04 PROCEDURE — 99024 POSTOP FOLLOW-UP VISIT: CPT

## 2019-10-04 NOTE — DISCUSSION/SUMMARY
[FreeTextEntry1] : 59 Year old female with a history of stage IC2 ovarian mucinous tumor. S/P debulking LUCINA/BSO Staging  on Aug 12, 2019 to no gross disease. The patient received 2 cycles of Carboplatin Taxol. She is tolerating treatment well. She presents for her second post operative evaluation. She is doing well with no clinical evidence of disease.\par \par -continue chemo\par -f/u visit in 2 months

## 2019-10-06 LAB
ALBUMIN SERPL ELPH-MCNC: 4.5 G/DL
ALP BLD-CCNC: 93 U/L
ALT SERPL-CCNC: 14 U/L
ANION GAP SERPL CALC-SCNC: 15 MMOL/L
AST SERPL-CCNC: 17 U/L
BILIRUB SERPL-MCNC: <0.2 MG/DL
BUN SERPL-MCNC: 10 MG/DL
CALCIUM SERPL-MCNC: 9.2 MG/DL
CANCER AG125 SERPL-ACNC: 11 U/ML
CHLORIDE SERPL-SCNC: 102 MMOL/L
CO2 SERPL-SCNC: 23 MMOL/L
CREAT SERPL-MCNC: 0.6 MG/DL
GLUCOSE SERPL-MCNC: 133 MG/DL
POTASSIUM SERPL-SCNC: 4.3 MMOL/L
PROT SERPL-MCNC: 7 G/DL
SODIUM SERPL-SCNC: 140 MMOL/L

## 2019-10-21 ENCOUNTER — APPOINTMENT (OUTPATIENT)
Dept: HEMATOLOGY ONCOLOGY | Facility: CLINIC | Age: 59
End: 2019-10-21
Payer: COMMERCIAL

## 2019-10-21 ENCOUNTER — LABORATORY RESULT (OUTPATIENT)
Age: 59
End: 2019-10-21

## 2019-10-21 VITALS
TEMPERATURE: 96.3 F | HEART RATE: 99 BPM | SYSTOLIC BLOOD PRESSURE: 125 MMHG | DIASTOLIC BLOOD PRESSURE: 71 MMHG | BODY MASS INDEX: 29.23 KG/M2 | WEIGHT: 145 LBS | HEIGHT: 59 IN

## 2019-10-21 LAB
HCT VFR BLD CALC: 36.6 %
HGB BLD-MCNC: 11.9 G/DL
MCHC RBC-ENTMCNC: 26.6 PG
MCHC RBC-ENTMCNC: 32.5 G/DL
MCV RBC AUTO: 81.9 FL
PLATELET # BLD AUTO: 197 K/UL
PMV BLD: 9.8 FL
RBC # BLD: 4.47 M/UL
RBC # FLD: 13.9 %
WBC # FLD AUTO: 4.8 K/UL

## 2019-10-21 PROCEDURE — 99215 OFFICE O/P EST HI 40 MIN: CPT

## 2019-10-22 LAB
ALBUMIN SERPL ELPH-MCNC: 4.6 G/DL
ALP BLD-CCNC: 91 U/L
ALT SERPL-CCNC: 18 U/L
ANION GAP SERPL CALC-SCNC: 13 MMOL/L
AST SERPL-CCNC: 19 U/L
BILIRUB SERPL-MCNC: <0.2 MG/DL
BUN SERPL-MCNC: 13 MG/DL
CALCIUM SERPL-MCNC: 9.7 MG/DL
CHLORIDE SERPL-SCNC: 103 MMOL/L
CO2 SERPL-SCNC: 26 MMOL/L
CREAT SERPL-MCNC: 0.6 MG/DL
GLUCOSE SERPL-MCNC: 86 MG/DL
POTASSIUM SERPL-SCNC: 4.2 MMOL/L
PROT SERPL-MCNC: 7.5 G/DL
SODIUM SERPL-SCNC: 142 MMOL/L

## 2019-10-23 NOTE — PHYSICAL EXAM
[Fully active, able to carry on all pre-disease performance without restriction] : Status 0 - Fully active, able to carry on all pre-disease performance without restriction [Normal] : affect appropriate [de-identified] : well healed midline infraumbilical scar, mild thickening in the lower part of the scar.

## 2019-10-23 NOTE — ASSESSMENT
[FreeTextEntry1] :  In summary this is a 59 year old woman with a diagnosis of Stage 1C2 endometrioid adenocarcinoma of ovary s/p LUCINA/BSO with no residual disease.\par On adjuvant chemotherapy s/p 1 cycle of Carboplatin and Taxol, tolerating it well\par \par RECOMMENDATIONS\par \par  She will continue chemotherapy with carboplatin and Taxol every 3 weeks  Cycle #3 /6 \par \par Side effects of chemotherapy including, but not limited to, nausea, vomiting, myelosuppression, risk of infection, neuropathy, myalgia, alopecia and allergic reactions were discussed.  \par The chemotherapy dose was adjusted according to pt's height, weight, labs and anticipated tolerance.\par The high risk of complications and complexity associated with antineoplastic therapy administration has been explained to the pt and family.\par Chemotherapy will be administered under my direct supervision\par \par GI prophylaxis.\par \par \par  labs have been ordered.\par Previous labs discussed.\par \par Pt reassured that scalp pain is secondary to chemo induced alopecia, should resolve soon.\par Pt had several questions which were answered in detail.\par \par

## 2019-10-23 NOTE — HISTORY OF PRESENT ILLNESS
[Disease: _____________________] : Disease: [unfilled] [N: ___] : N[unfilled] [T: ___] : T[unfilled] [M: ___] : M[unfilled] [AJCC Stage: ____] : AJCC Stage: [unfilled] [de-identified] : 59 yof, LMP at age 49, h/o hypercholesterolemia originally presented to ED in July 2019 for 2 weeks of yellow vaginal discharge and 1 day of light vaginal bleeding.  She saw Dr. Lockhart in [place] and was found to have a pelvic mass and a possible "swollen tube" according to outpatient sonogram and MRI done in [place].  Patient subsequently saw Dr. Mcnally and was scheduled for laparoscopic LUCINA/BSO, which was converted to exploratory laparotomy secondary to multiple adhesions.  \par \par Work up\par Complete Pelvic Sonogram (7/26/2019): The uterus measures 7.4 x 5.6 x 3.8 cm, with normal echogenicity \par and morphology. The endometrial echo complex measures 0.3 cm, which is normal in thickness. A very trace amount of endometrial fluid is seen. Within the right adnexa, extending past the midline, a part-cystic, heterogeneous mass with internal vascularity measures 12.4 x 11.2 x 7.2 cm without a distinguishable native ovary. An associated dilated tubular structure is seen adjacent to this mass, possibly representing hydrosalpinx. Nonspecific nonvisualization of the left ovary. No free fluid in the pelvis.\par \par MRI (6/4/2019): midline complex cystic and solid enhancing lesion likely originating from the ovary/ovaries concerning for cystic ovarian neoplasm. Suspicion of left-sided hydrosalpinx, small intrauterine fibroids.\par \par 7/26/2019:\par CEA 7.2\par CA 19-9 693 \par CA-125 88\par \par  \par Diag lap: LUCINA/BSO 8/12/19\par FINDINGS\par large left adnexal mass, around 6cm in size, friable, large right adnexal mass around 8 cm in size, no normal ovary identified bilaterally, right mass completely adherent to right pelvic side wall, unable to dissect it off safely, unable to identify IP ligament on right side.\par  Post surgery: no gross residual\par \par PATHOLOGY\par 1. Uterus, cervix, bilateral tubes and ovaries, total\par hysterectomy and bilateral salpingo-oophorectomy:\par - Endometrioid adenocarcinoma, of the right ovary, 7 cm,\par moderately differentiated, involving the ovarian surface.\par - Left fallopian tube, involved by carcinoma.\par - Right fallopian tube and paratubal tissue with endometriosis;\par no carcinoma seen.\par - Left ovary with foci of endometriosis; no carcinoma seen.\par - Uterine corpus with leiomyoma and adenomyosis; no carcinoma\par seen.\par - Cervix with Nabothian cysts and mild chronic cervicitis; no\par carcinoma seen.\par - Pathologic staging: pT1c2, pN0, pMx\par \par Pt was started on adjuvant chemotherapy on 9/11/19 with Carboplatin and Taxol\par  [de-identified] : Endometrioid [de-identified] : Normal nuclear expression of all proteins is seen in tumor cell\par nuclei.  There is no evidence of mismatch repair deficiency by\par immunohistochemical evaluation.\par Final Diagnosis\par 1. Uterus, cervix, bilateral tubes and ovaries, total\par hysterectomy and bilateral salpingo-oophorectomy:\par - Endometrioid adenocarcinoma, of the right ovary, 7 cm,\par moderately differentiated, involving the ovarian surface.\par - Left fallopian tube, involved by carcinoma.\par - Right fallopian tube and paratubal tissue with endometriosis;\par no carcinoma seen.\par - Left ovary with foci of endometriosis; no carcinoma seen.\par - Uterine corpus with leiomyoma and adenomyosis; no carcinoma\par seen.\par - Cervix with Nabothian cysts and mild chronic cervicitis; no\par carcinoma seen.\par - Pathologic staging: pT1c2, pN0, pMx\par \par 2. Omentum tissue, omentectomy:\par -  Fibroadipose tissue; no carcinoma seen.\par \par 3. Appendix, appendectomy:\par - Appendix with acute periappendicitis; no carcinoma seen.\par \par 4. Lymph node, right para-aortic, dissection:\par - Three lymph nodes, no carcinoma seen (0/3).\par \par 5. Lymph node, left common iliac, dissection:\par -  Nine lymph nodes, no carcinoma seen (0/9).\par \par 6. Lymph node, left pelvic, dissection:\par -  Twelve lymph nodes, no carcinoma seen (0/12).\par \par 7. Lymph node, left , dissection:\par - Six lymph nodes, no carcinoma seen (0/6).\par  [de-identified] : Pt has had an uneventful post op course.\par No abdominal pain, bowel abnormalities.\par Appetite is improving.\par No vaginal bleeding or discharge\par \par 10/3/19\par Pt is here for follow up.\par She is s/p 1 cycle of Carboplatin/Taxol\par She took nausea meds for 2-3 days, although she was not nauseous.\par She however was constipated x 4 days, which was relieved with Colace and Dulcolax.\par No vaginal bleeding.\par Has twinges of pain in RUQ and at the site of surgery.\par no fever or mouth sores.\par \par 10/21/19\par Pt is here for follow up.\par Tolerating chemo well.\par No significant nausea or vomiting. C/O GERD like symptoms.\par No abd pain, vag bleeding\par no fever or mouth sores.\par C/O scalp pain, hair is falling.\par

## 2019-10-23 NOTE — CONSULT LETTER
[Dear  ___] : Dear  [unfilled], [Please see my note below.] : Please see my note below. [Consult Letter:] : I had the pleasure of evaluating your patient, [unfilled]. [Sincerely,] : Sincerely, [Consult Closing:] : Thank you very much for allowing me to participate in the care of this patient.  If you have any questions, please do not hesitate to contact me. [DrJeffrey  ___] : Dr. WILBURN [FreeTextEntry3] : Vanessa Herman MD

## 2019-10-25 ENCOUNTER — APPOINTMENT (OUTPATIENT)
Dept: INFUSION THERAPY | Facility: CLINIC | Age: 59
End: 2019-10-25

## 2019-10-25 ENCOUNTER — RX RENEWAL (OUTPATIENT)
Age: 59
End: 2019-10-25

## 2019-10-25 RX ORDER — CARBOPLATIN 50 MG
625 VIAL (EA) INTRAVENOUS ONCE
Refills: 0 | Status: COMPLETED | OUTPATIENT
Start: 2019-10-25 | End: 2019-10-25

## 2019-10-25 RX ORDER — FOSAPREPITANT DIMEGLUMINE 150 MG/5ML
150 INJECTION, POWDER, LYOPHILIZED, FOR SOLUTION INTRAVENOUS ONCE
Refills: 0 | Status: COMPLETED | OUTPATIENT
Start: 2019-10-25 | End: 2019-10-25

## 2019-10-25 RX ORDER — PACLITAXEL 6 MG/ML
285 INJECTION, SOLUTION, CONCENTRATE INTRAVENOUS ONCE
Refills: 0 | Status: COMPLETED | OUTPATIENT
Start: 2019-10-25 | End: 2019-10-25

## 2019-10-25 RX ORDER — DIPHENHYDRAMINE HCL 50 MG
50 CAPSULE ORAL ONCE
Refills: 0 | Status: COMPLETED | OUTPATIENT
Start: 2019-10-25 | End: 2019-10-25

## 2019-10-25 RX ORDER — DEXAMETHASONE 0.5 MG/5ML
20 ELIXIR ORAL ONCE
Refills: 0 | Status: COMPLETED | OUTPATIENT
Start: 2019-10-25 | End: 2019-10-25

## 2019-10-25 RX ORDER — FAMOTIDINE 10 MG/ML
20 INJECTION INTRAVENOUS ONCE
Refills: 0 | Status: COMPLETED | OUTPATIENT
Start: 2019-10-25 | End: 2019-10-25

## 2019-10-25 RX ADMIN — Medication 312.5 MILLIGRAM(S): at 14:50

## 2019-10-25 RX ADMIN — Medication 50 MILLIGRAM(S): at 11:30

## 2019-10-25 RX ADMIN — Medication 625 MILLIGRAM(S): at 15:50

## 2019-10-25 RX ADMIN — PACLITAXEL 182.5 MILLIGRAM(S): 6 INJECTION, SOLUTION, CONCENTRATE INTRAVENOUS at 11:50

## 2019-10-25 RX ADMIN — Medication 20 MILLIGRAM(S): at 11:10

## 2019-10-25 RX ADMIN — FAMOTIDINE 156 MILLIGRAM(S): 10 INJECTION INTRAVENOUS at 11:30

## 2019-10-25 RX ADMIN — FOSAPREPITANT DIMEGLUMINE 450 MILLIGRAM(S): 150 INJECTION, POWDER, LYOPHILIZED, FOR SOLUTION INTRAVENOUS at 10:32

## 2019-10-25 RX ADMIN — FAMOTIDINE 20 MILLIGRAM(S): 10 INJECTION INTRAVENOUS at 11:50

## 2019-10-25 RX ADMIN — PACLITAXEL 285 MILLIGRAM(S): 6 INJECTION, SOLUTION, CONCENTRATE INTRAVENOUS at 14:50

## 2019-10-25 RX ADMIN — Medication 189 MILLIGRAM(S): at 10:50

## 2019-10-25 RX ADMIN — FOSAPREPITANT DIMEGLUMINE 150 MILLIGRAM(S): 150 INJECTION, POWDER, LYOPHILIZED, FOR SOLUTION INTRAVENOUS at 10:50

## 2019-10-25 RX ADMIN — Medication 153 MILLIGRAM(S): at 11:10

## 2019-11-11 ENCOUNTER — LABORATORY RESULT (OUTPATIENT)
Age: 59
End: 2019-11-11

## 2019-11-11 ENCOUNTER — APPOINTMENT (OUTPATIENT)
Dept: HEMATOLOGY ONCOLOGY | Facility: CLINIC | Age: 59
End: 2019-11-11
Payer: COMMERCIAL

## 2019-11-11 VITALS
HEART RATE: 83 BPM | BODY MASS INDEX: 29.43 KG/M2 | HEIGHT: 59 IN | TEMPERATURE: 97 F | DIASTOLIC BLOOD PRESSURE: 59 MMHG | SYSTOLIC BLOOD PRESSURE: 121 MMHG | WEIGHT: 146 LBS

## 2019-11-11 LAB
HCT VFR BLD CALC: 33.3 %
HGB BLD-MCNC: 11 G/DL
MCHC RBC-ENTMCNC: 27.2 PG
MCHC RBC-ENTMCNC: 33 G/DL
MCV RBC AUTO: 82.2 FL
PLATELET # BLD AUTO: 171 K/UL
PMV BLD: 9.3 FL
RBC # BLD: 4.05 M/UL
RBC # FLD: 14.3 %
WBC # FLD AUTO: 3.2 K/UL

## 2019-11-11 PROCEDURE — 99215 OFFICE O/P EST HI 40 MIN: CPT

## 2019-11-12 LAB
ALBUMIN SERPL ELPH-MCNC: 4.2 G/DL
ALP BLD-CCNC: 94 U/L
ALT SERPL-CCNC: 15 U/L
ANION GAP SERPL CALC-SCNC: 15 MMOL/L
AST SERPL-CCNC: 17 U/L
BILIRUB SERPL-MCNC: <0.2 MG/DL
BUN SERPL-MCNC: 13 MG/DL
CALCIUM SERPL-MCNC: 9.2 MG/DL
CHLORIDE SERPL-SCNC: 102 MMOL/L
CO2 SERPL-SCNC: 24 MMOL/L
CREAT SERPL-MCNC: 0.6 MG/DL
GLUCOSE SERPL-MCNC: 81 MG/DL
POTASSIUM SERPL-SCNC: 4.2 MMOL/L
PROT SERPL-MCNC: 7 G/DL
SODIUM SERPL-SCNC: 141 MMOL/L

## 2019-11-13 ENCOUNTER — TRANSCRIPTION ENCOUNTER (OUTPATIENT)
Age: 59
End: 2019-11-13

## 2019-11-15 ENCOUNTER — LABORATORY RESULT (OUTPATIENT)
Age: 59
End: 2019-11-15

## 2019-11-15 ENCOUNTER — APPOINTMENT (OUTPATIENT)
Dept: INFUSION THERAPY | Facility: CLINIC | Age: 59
End: 2019-11-15

## 2019-11-15 RX ORDER — PACLITAXEL 6 MG/ML
285 INJECTION, SOLUTION, CONCENTRATE INTRAVENOUS ONCE
Refills: 0 | Status: COMPLETED | OUTPATIENT
Start: 2019-11-15 | End: 2019-11-15

## 2019-11-15 RX ORDER — FAMOTIDINE 10 MG/ML
20 INJECTION INTRAVENOUS ONCE
Refills: 0 | Status: COMPLETED | OUTPATIENT
Start: 2019-11-15 | End: 2019-11-15

## 2019-11-15 RX ORDER — DIPHENHYDRAMINE HCL 50 MG
50 CAPSULE ORAL ONCE
Refills: 0 | Status: COMPLETED | OUTPATIENT
Start: 2019-11-15 | End: 2019-11-15

## 2019-11-15 RX ORDER — FOSAPREPITANT DIMEGLUMINE 150 MG/5ML
150 INJECTION, POWDER, LYOPHILIZED, FOR SOLUTION INTRAVENOUS ONCE
Refills: 0 | Status: COMPLETED | OUTPATIENT
Start: 2019-11-15 | End: 2019-11-15

## 2019-11-15 RX ORDER — CARBOPLATIN 50 MG
625 VIAL (EA) INTRAVENOUS ONCE
Refills: 0 | Status: COMPLETED | OUTPATIENT
Start: 2019-11-15 | End: 2019-11-15

## 2019-11-15 RX ORDER — DEXAMETHASONE 0.5 MG/5ML
20 ELIXIR ORAL ONCE
Refills: 0 | Status: COMPLETED | OUTPATIENT
Start: 2019-11-15 | End: 2019-11-15

## 2019-11-15 RX ADMIN — PACLITAXEL 285 MILLIGRAM(S): 6 INJECTION, SOLUTION, CONCENTRATE INTRAVENOUS at 16:45

## 2019-11-15 RX ADMIN — Medication 50 MILLIGRAM(S): at 15:05

## 2019-11-15 RX ADMIN — Medication 20 MILLIGRAM(S): at 14:20

## 2019-11-15 RX ADMIN — PACLITAXEL 182.5 MILLIGRAM(S): 6 INJECTION, SOLUTION, CONCENTRATE INTRAVENOUS at 14:50

## 2019-11-15 RX ADMIN — FOSAPREPITANT DIMEGLUMINE 150 MILLIGRAM(S): 150 INJECTION, POWDER, LYOPHILIZED, FOR SOLUTION INTRAVENOUS at 13:50

## 2019-11-15 RX ADMIN — Medication 153 MILLIGRAM(S): at 13:50

## 2019-11-15 RX ADMIN — FOSAPREPITANT DIMEGLUMINE 450 MILLIGRAM(S): 150 INJECTION, POWDER, LYOPHILIZED, FOR SOLUTION INTRAVENOUS at 13:30

## 2019-11-15 RX ADMIN — FAMOTIDINE 20 MILLIGRAM(S): 10 INJECTION INTRAVENOUS at 14:35

## 2019-11-15 RX ADMIN — Medication 189 MILLIGRAM(S): at 14:05

## 2019-11-15 RX ADMIN — FAMOTIDINE 156 MILLIGRAM(S): 10 INJECTION INTRAVENOUS at 14:20

## 2019-11-15 RX ADMIN — Medication 312.5 MILLIGRAM(S): at 14:50

## 2019-11-15 RX ADMIN — Medication 625 MILLIGRAM(S): at 17:45

## 2019-11-18 LAB
HCT VFR BLD CALC: 35.6 %
HGB BLD-MCNC: 11.6 G/DL
MCHC RBC-ENTMCNC: 27 PG
MCHC RBC-ENTMCNC: 32.6 G/DL
MCV RBC AUTO: 82.8 FL
PLATELET # BLD AUTO: 231 K/UL
PMV BLD: 10 FL
RBC # BLD: 4.3 M/UL
RBC # FLD: 14.5 %
WBC # FLD AUTO: 4.54 K/UL

## 2019-11-24 NOTE — PHYSICAL EXAM
[Fully active, able to carry on all pre-disease performance without restriction] : Status 0 - Fully active, able to carry on all pre-disease performance without restriction [Normal] : affect appropriate [de-identified] : well healed midline infraumbilical scar, mild thickening in the lower part of the scar.

## 2019-11-24 NOTE — ASSESSMENT
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  \par The chemotherapy dose was adjusted according to pt's height, weight, labs and anticipated tolerance.\par The high risk of complications and complexity associated with antineoplastic therapy administration has been explained to the pt and family.\par Chemotherapy will be administered under my direct supervision\par \par GI prophylaxis.\par \par \par  labs have been ordered.\par Previous labs discussed.\par \par

## 2019-11-24 NOTE — CONSULT LETTER
[Dear  ___] : Dear  [unfilled], [Consult Letter:] : I had the pleasure of evaluating your patient, [unfilled]. [Please see my note below.] : Please see my note below. [Consult Closing:] : Thank you very much for allowing me to participate in the care of this patient.  If you have any questions, please do not hesitate to contact me. [DrJeffrey  ___] : Dr. WILBURN [Sincerely,] : Sincerely, [FreeTextEntry3] : Vanessa Herman MD

## 2019-11-24 NOTE — HISTORY OF PRESENT ILLNESS
[Disease: _____________________] : Disease: [unfilled] [T: ___] : T[unfilled] [N: ___] : N[unfilled] [M: ___] : M[unfilled] [AJCC Stage: ____] : AJCC Stage: [unfilled] [de-identified] : Endometrioid [de-identified] : 59 yof, LMP at age 49, h/o hypercholesterolemia originally presented to ED in July 2019 for 2 weeks of yellow vaginal discharge and 1 day of light vaginal bleeding.  She saw Dr. Lockhart in [place] and was found to have a pelvic mass and a possible "swollen tube" according to outpatient sonogram and MRI done in [place].  Patient subsequently saw Dr. Mcnally and was scheduled for laparoscopic LUCINA/BSO, which was converted to exploratory laparotomy secondary to multiple adhesions.  \par \par Work up\par Complete Pelvic Sonogram (7/26/2019): The uterus measures 7.4 x 5.6 x 3.8 cm, with normal echogenicity \par and morphology. The endometrial echo complex measures 0.3 cm, which is normal in thickness. A very trace amount of endometrial fluid is seen. Within the right adnexa, extending past the midline, a part-cystic, heterogeneous mass with internal vascularity measures 12.4 x 11.2 x 7.2 cm without a distinguishable native ovary. An associated dilated tubular structure is seen adjacent to this mass, possibly representing hydrosalpinx. Nonspecific nonvisualization of the left ovary. No free fluid in the pelvis.\par \par MRI (6/4/2019): midline complex cystic and solid enhancing lesion likely originating from the ovary/ovaries concerning for cystic ovarian neoplasm. Suspicion of left-sided hydrosalpinx, small intrauterine fibroids.\par \par 7/26/2019:\par CEA 7.2\par CA 19-9 693 \par CA-125 88\par \par  \par Diag lap: LUCINA/BSO 8/12/19\par FINDINGS\par large left adnexal mass, around 6cm in size, friable, large right adnexal mass around 8 cm in size, no normal ovary identified bilaterally, right mass completely adherent to right pelvic side wall, unable to dissect it off safely, unable to identify IP ligament on right side.\par  Post surgery: no gross residual\par \par PATHOLOGY\par 1. Uterus, cervix, bilateral tubes and ovaries, total\par hysterectomy and bilateral salpingo-oophorectomy:\par - Endometrioid adenocarcinoma, of the right ovary, 7 cm,\par moderately differentiated, involving the ovarian surface.\par - Left fallopian tube, involved by carcinoma.\par - Right fallopian tube and paratubal tissue with endometriosis;\par no carcinoma seen.\par - Left ovary with foci of endometriosis; no carcinoma seen.\par - Uterine corpus with leiomyoma and adenomyosis; no carcinoma\par seen.\par - Cervix with Nabothian cysts and mild chronic cervicitis; no\par carcinoma seen.\par - Pathologic staging: pT1c2, pN0, pMx\par \par Pt was started on adjuvant chemotherapy on 9/11/19 with Carboplatin and Taxol\par  [de-identified] : Pt has had an uneventful post op course.\par No abdominal pain, bowel abnormalities.\par Appetite is improving.\par No vaginal bleeding or discharge\par \par 10/3/19\par Pt is here for follow up.\par She is s/p 1 cycle of Carboplatin/Taxol\par She took nausea meds for 2-3 days, although she was not nauseous.\par She however was constipated x 4 days, which was relieved with Colace and Dulcolax.\par No vaginal bleeding.\par Has twinges of pain in RUQ and at the site of surgery.\par no fever or mouth sores.\par \par 10/21/19\par Pt is here for follow up.\par Tolerating chemo well.\par No significant nausea or vomiting. C/O GERD like symptoms.\par No abd pain, vag bleeding\par no fever or mouth sores.\par C/O scalp pain, hair is falling.\par 11/11/19\par \par pt is here for follow up.\par C/O having had nausea in the first week with improvement  after she takes nausea meds,\par No fever, mouth sores or diarrhea\par \par  [de-identified] : Normal nuclear expression of all proteins is seen in tumor cell\par nuclei.  There is no evidence of mismatch repair deficiency by\par immunohistochemical evaluation.\par Final Diagnosis\par 1. Uterus, cervix, bilateral tubes and ovaries, total\par hysterectomy and bilateral salpingo-oophorectomy:\par - Endometrioid adenocarcinoma, of the right ovary, 7 cm,\par moderately differentiated, involving the ovarian surface.\par - Left fallopian tube, involved by carcinoma.\par - Right fallopian tube and paratubal tissue with endometriosis;\par no carcinoma seen.\par - Left ovary with foci of endometriosis; no carcinoma seen.\par - Uterine corpus with leiomyoma and adenomyosis; no carcinoma\par seen.\par - Cervix with Nabothian cysts and mild chronic cervicitis; no\par carcinoma seen.\par - Pathologic staging: pT1c2, pN0, pMx\par \par 2. Omentum tissue, omentectomy:\par -  Fibroadipose tissue; no carcinoma seen.\par \par 3. Appendix, appendectomy:\par - Appendix with acute periappendicitis; no carcinoma seen.\par \par 4. Lymph node, right para-aortic, dissection:\par - Three lymph nodes, no carcinoma seen (0/3).\par \par 5. Lymph node, left common iliac, dissection:\par -  Nine lymph nodes, no carcinoma seen (0/9).\par \par 6. Lymph node, left pelvic, dissection:\par -  Twelve lymph nodes, no carcinoma seen (0/12).\par \par 7. Lymph node, left , dissection:\par - Six lymph nodes, no carcinoma seen (0/6).\par

## 2019-12-02 ENCOUNTER — APPOINTMENT (OUTPATIENT)
Dept: HEMATOLOGY ONCOLOGY | Facility: CLINIC | Age: 59
End: 2019-12-02
Payer: COMMERCIAL

## 2019-12-02 ENCOUNTER — LABORATORY RESULT (OUTPATIENT)
Age: 59
End: 2019-12-02

## 2019-12-02 VITALS
HEIGHT: 59 IN | HEART RATE: 94 BPM | BODY MASS INDEX: 29.84 KG/M2 | DIASTOLIC BLOOD PRESSURE: 69 MMHG | WEIGHT: 148 LBS | SYSTOLIC BLOOD PRESSURE: 138 MMHG | TEMPERATURE: 97.2 F

## 2019-12-02 LAB
HCT VFR BLD CALC: 35.8 %
HGB BLD-MCNC: 11.8 G/DL
MCHC RBC-ENTMCNC: 27.4 PG
MCHC RBC-ENTMCNC: 33 G/DL
MCV RBC AUTO: 83.3 FL
PLATELET # BLD AUTO: 145 K/UL
PMV BLD: 10.6 FL
RBC # BLD: 4.3 M/UL
RBC # FLD: 15.8 %
WBC # FLD AUTO: 3.56 K/UL

## 2019-12-02 PROCEDURE — 99215 OFFICE O/P EST HI 40 MIN: CPT

## 2019-12-04 LAB
ALBUMIN SERPL ELPH-MCNC: 4.6 G/DL
ALP BLD-CCNC: 105 U/L
ALT SERPL-CCNC: 22 U/L
ANION GAP SERPL CALC-SCNC: 17 MMOL/L
AST SERPL-CCNC: 23 U/L
BILIRUB SERPL-MCNC: <0.2 MG/DL
BUN SERPL-MCNC: 12 MG/DL
CALCIUM SERPL-MCNC: 9.7 MG/DL
CANCER AG125 SERPL-ACNC: 9 U/ML
CHLORIDE SERPL-SCNC: 100 MMOL/L
CO2 SERPL-SCNC: 23 MMOL/L
CREAT SERPL-MCNC: 0.6 MG/DL
GLUCOSE SERPL-MCNC: 93 MG/DL
POTASSIUM SERPL-SCNC: 4 MMOL/L
PROT SERPL-MCNC: 7.7 G/DL
SODIUM SERPL-SCNC: 140 MMOL/L

## 2019-12-05 NOTE — ASSESSMENT
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  \par The chemotherapy dose was adjusted according to pt's height, weight, labs and anticipated tolerance.\par The high risk of complications and complexity associated with antineoplastic therapy administration has been explained to the pt and family.\par Chemotherapy will be administered under my direct supervision\par \par GI prophylaxis.\par \par \par  labs have been ordered.\par Previous labs discussed.\par \par

## 2019-12-05 NOTE — PHYSICAL EXAM
[Fully active, able to carry on all pre-disease performance without restriction] : Status 0 - Fully active, able to carry on all pre-disease performance without restriction [Normal] : affect appropriate [de-identified] : well healed midline infraumbilical scar, mild thickening in the lower part of the scar.

## 2019-12-05 NOTE — PHYSICAL EXAM
[Fully active, able to carry on all pre-disease performance without restriction] : Status 0 - Fully active, able to carry on all pre-disease performance without restriction [Normal] : affect appropriate [de-identified] : well healed midline infraumbilical scar, mild thickening in the lower part of the scar.

## 2019-12-05 NOTE — CONSULT LETTER
[Consult Letter:] : I had the pleasure of evaluating your patient, [unfilled]. [Dear  ___] : Dear  [unfilled], [Please see my note below.] : Please see my note below. [Consult Closing:] : Thank you very much for allowing me to participate in the care of this patient.  If you have any questions, please do not hesitate to contact me. [DrJeffrey  ___] : Dr. WILBURN [Sincerely,] : Sincerely, [FreeTextEntry3] : Vanessa Herman MD

## 2019-12-05 NOTE — HISTORY OF PRESENT ILLNESS
[Disease: _____________________] : Disease: [unfilled] [T: ___] : T[unfilled] [N: ___] : N[unfilled] [M: ___] : M[unfilled] [AJCC Stage: ____] : AJCC Stage: [unfilled] [de-identified] : 59 yof, LMP at age 49, h/o hypercholesterolemia originally presented to ED in July 2019 for 2 weeks of yellow vaginal discharge and 1 day of light vaginal bleeding.  She saw Dr. Lockhart in [place] and was found to have a pelvic mass and a possible "swollen tube" according to outpatient sonogram and MRI done in [place].  Patient subsequently saw Dr. Mcnally and was scheduled for laparoscopic LUCINA/BSO, which was converted to exploratory laparotomy secondary to multiple adhesions.  \par \par Work up\par Complete Pelvic Sonogram (7/26/2019): The uterus measures 7.4 x 5.6 x 3.8 cm, with normal echogenicity \par and morphology. The endometrial echo complex measures 0.3 cm, which is normal in thickness. A very trace amount of endometrial fluid is seen. Within the right adnexa, extending past the midline, a part-cystic, heterogeneous mass with internal vascularity measures 12.4 x 11.2 x 7.2 cm without a distinguishable native ovary. An associated dilated tubular structure is seen adjacent to this mass, possibly representing hydrosalpinx. Nonspecific nonvisualization of the left ovary. No free fluid in the pelvis.\par \par MRI (6/4/2019): midline complex cystic and solid enhancing lesion likely originating from the ovary/ovaries concerning for cystic ovarian neoplasm. Suspicion of left-sided hydrosalpinx, small intrauterine fibroids.\par \par 7/26/2019:\par CEA 7.2\par CA 19-9 693 \par CA-125 88\par \par  \par Diag lap: LUCINA/BSO 8/12/19\par FINDINGS\par large left adnexal mass, around 6cm in size, friable, large right adnexal mass around 8 cm in size, no normal ovary identified bilaterally, right mass completely adherent to right pelvic side wall, unable to dissect it off safely, unable to identify IP ligament on right side.\par  Post surgery: no gross residual\par \par PATHOLOGY\par 1. Uterus, cervix, bilateral tubes and ovaries, total\par hysterectomy and bilateral salpingo-oophorectomy:\par - Endometrioid adenocarcinoma, of the right ovary, 7 cm,\par moderately differentiated, involving the ovarian surface.\par - Left fallopian tube, involved by carcinoma.\par - Right fallopian tube and paratubal tissue with endometriosis;\par no carcinoma seen.\par - Left ovary with foci of endometriosis; no carcinoma seen.\par - Uterine corpus with leiomyoma and adenomyosis; no carcinoma\par seen.\par - Cervix with Nabothian cysts and mild chronic cervicitis; no\par carcinoma seen.\par - Pathologic staging: pT1c2, pN0, pMx\par \par Pt was started on adjuvant chemotherapy on 9/11/19 with Carboplatin and Taxol\par  [de-identified] : Endometrioid [de-identified] : Pt has had an uneventful post op course.\par No abdominal pain, bowel abnormalities.\par Appetite is improving.\par No vaginal bleeding or discharge\par \par 10/3/19\par Pt is here for follow up.\par She is s/p 1 cycle of Carboplatin/Taxol\par She took nausea meds for 2-3 days, although she was not nauseous.\par She however was constipated x 4 days, which was relieved with Colace and Dulcolax.\par No vaginal bleeding.\par Has twinges of pain in RUQ and at the site of surgery.\par no fever or mouth sores.\par \par 10/21/19\par Pt is here for follow up.\par Tolerating chemo well.\par No significant nausea or vomiting. C/O GERD like symptoms.\par No abd pain, vag bleeding\par no fever or mouth sores.\par C/O scalp pain, hair is falling.\par 11/11/19\par \par pt is here for follow up.\par C/O having had nausea in the first week with improvement  after she takes nausea meds,\par No fever, mouth sores or diarrhea\par \par 12/2/19\par Pt is here for follow up.\par No new complaints.\par Tolerated chemo cycle #4 well.\par No N, V, D, fever\par No mouth sores, abdominal pain.\par no neuropathy [de-identified] : Normal nuclear expression of all proteins is seen in tumor cell\par nuclei.  There is no evidence of mismatch repair deficiency by\par immunohistochemical evaluation.\par Final Diagnosis\par 1. Uterus, cervix, bilateral tubes and ovaries, total\par hysterectomy and bilateral salpingo-oophorectomy:\par - Endometrioid adenocarcinoma, of the right ovary, 7 cm,\par moderately differentiated, involving the ovarian surface.\par - Left fallopian tube, involved by carcinoma.\par - Right fallopian tube and paratubal tissue with endometriosis;\par no carcinoma seen.\par - Left ovary with foci of endometriosis; no carcinoma seen.\par - Uterine corpus with leiomyoma and adenomyosis; no carcinoma\par seen.\par - Cervix with Nabothian cysts and mild chronic cervicitis; no\par carcinoma seen.\par - Pathologic staging: pT1c2, pN0, pMx\par \par 2. Omentum tissue, omentectomy:\par -  Fibroadipose tissue; no carcinoma seen.\par \par 3. Appendix, appendectomy:\par - Appendix with acute periappendicitis; no carcinoma seen.\par \par 4. Lymph node, right para-aortic, dissection:\par - Three lymph nodes, no carcinoma seen (0/3).\par \par 5. Lymph node, left common iliac, dissection:\par -  Nine lymph nodes, no carcinoma seen (0/9).\par \par 6. Lymph node, left pelvic, dissection:\par -  Twelve lymph nodes, no carcinoma seen (0/12).\par \par 7. Lymph node, left , dissection:\par - Six lymph nodes, no carcinoma seen (0/6).\par

## 2019-12-06 ENCOUNTER — APPOINTMENT (OUTPATIENT)
Dept: INFUSION THERAPY | Facility: CLINIC | Age: 59
End: 2019-12-06

## 2019-12-06 ENCOUNTER — LABORATORY RESULT (OUTPATIENT)
Age: 59
End: 2019-12-06

## 2019-12-06 ENCOUNTER — RESULT REVIEW (OUTPATIENT)
Age: 59
End: 2019-12-06

## 2019-12-06 LAB
HCT VFR BLD CALC: 35.2 %
HGB BLD-MCNC: 11.5 G/DL
MCHC RBC-ENTMCNC: 27.6 PG
MCHC RBC-ENTMCNC: 32.7 G/DL
MCV RBC AUTO: 84.4 FL
PLATELET # BLD AUTO: 153 K/UL
PMV BLD: 10.4 FL
RBC # BLD: 4.17 M/UL
RBC # FLD: 16.1 %
WBC # FLD AUTO: 6.34 K/UL

## 2019-12-06 RX ORDER — FOSAPREPITANT DIMEGLUMINE 150 MG/5ML
150 INJECTION, POWDER, LYOPHILIZED, FOR SOLUTION INTRAVENOUS ONCE
Refills: 0 | Status: COMPLETED | OUTPATIENT
Start: 2019-12-06 | End: 2019-12-06

## 2019-12-06 RX ORDER — FAMOTIDINE 10 MG/ML
20 INJECTION INTRAVENOUS ONCE
Refills: 0 | Status: COMPLETED | OUTPATIENT
Start: 2019-12-06 | End: 2019-12-06

## 2019-12-06 RX ORDER — DEXAMETHASONE 0.5 MG/5ML
20 ELIXIR ORAL ONCE
Refills: 0 | Status: COMPLETED | OUTPATIENT
Start: 2019-12-06 | End: 2019-12-06

## 2019-12-06 RX ORDER — PACLITAXEL 6 MG/ML
285 INJECTION, SOLUTION, CONCENTRATE INTRAVENOUS ONCE
Refills: 0 | Status: COMPLETED | OUTPATIENT
Start: 2019-12-06 | End: 2019-12-06

## 2019-12-06 RX ORDER — DIPHENHYDRAMINE HCL 50 MG
50 CAPSULE ORAL ONCE
Refills: 0 | Status: COMPLETED | OUTPATIENT
Start: 2019-12-06 | End: 2019-12-06

## 2019-12-06 RX ORDER — CARBOPLATIN 50 MG
625 VIAL (EA) INTRAVENOUS ONCE
Refills: 0 | Status: COMPLETED | OUTPATIENT
Start: 2019-12-06 | End: 2019-12-06

## 2019-12-06 RX ADMIN — Medication 312.5 MILLIGRAM(S): at 12:48

## 2019-12-06 RX ADMIN — FOSAPREPITANT DIMEGLUMINE 450 MILLIGRAM(S): 150 INJECTION, POWDER, LYOPHILIZED, FOR SOLUTION INTRAVENOUS at 11:57

## 2019-12-06 RX ADMIN — PACLITAXEL 182.5 MILLIGRAM(S): 6 INJECTION, SOLUTION, CONCENTRATE INTRAVENOUS at 12:47

## 2019-12-06 RX ADMIN — FAMOTIDINE 156 MILLIGRAM(S): 10 INJECTION INTRAVENOUS at 11:56

## 2019-12-06 RX ADMIN — Medication 153 MILLIGRAM(S): at 11:57

## 2019-12-06 RX ADMIN — Medication 189 MILLIGRAM(S): at 11:57

## 2019-12-24 ENCOUNTER — RX RENEWAL (OUTPATIENT)
Age: 59
End: 2019-12-24

## 2019-12-26 ENCOUNTER — LABORATORY RESULT (OUTPATIENT)
Age: 59
End: 2019-12-26

## 2019-12-26 ENCOUNTER — APPOINTMENT (OUTPATIENT)
Dept: HEMATOLOGY ONCOLOGY | Facility: CLINIC | Age: 59
End: 2019-12-26
Payer: COMMERCIAL

## 2019-12-26 ENCOUNTER — APPOINTMENT (OUTPATIENT)
Dept: INFUSION THERAPY | Facility: CLINIC | Age: 59
End: 2019-12-26

## 2019-12-26 VITALS
HEART RATE: 83 BPM | SYSTOLIC BLOOD PRESSURE: 125 MMHG | WEIGHT: 145 LBS | HEIGHT: 59 IN | DIASTOLIC BLOOD PRESSURE: 60 MMHG | TEMPERATURE: 97.4 F | BODY MASS INDEX: 29.23 KG/M2 | RESPIRATION RATE: 18 BRPM

## 2019-12-26 PROCEDURE — 99214 OFFICE O/P EST MOD 30 MIN: CPT

## 2019-12-26 RX ORDER — PACLITAXEL 6 MG/ML
285 INJECTION, SOLUTION, CONCENTRATE INTRAVENOUS ONCE
Refills: 0 | Status: COMPLETED | OUTPATIENT
Start: 2019-12-26 | End: 2019-12-26

## 2019-12-26 RX ORDER — CARBOPLATIN 50 MG
625 VIAL (EA) INTRAVENOUS ONCE
Refills: 0 | Status: COMPLETED | OUTPATIENT
Start: 2019-12-26 | End: 2019-12-26

## 2019-12-26 RX ORDER — DIPHENHYDRAMINE HCL 50 MG
50 CAPSULE ORAL ONCE
Refills: 0 | Status: COMPLETED | OUTPATIENT
Start: 2019-12-26 | End: 2019-12-26

## 2019-12-26 RX ORDER — FOSAPREPITANT DIMEGLUMINE 150 MG/5ML
150 INJECTION, POWDER, LYOPHILIZED, FOR SOLUTION INTRAVENOUS ONCE
Refills: 0 | Status: COMPLETED | OUTPATIENT
Start: 2019-12-26 | End: 2019-12-26

## 2019-12-26 RX ORDER — GLUC/MSM/COLGN2/HYAL/ANTIARTH3 375-375-20
TABLET ORAL
Refills: 0 | Status: DISCONTINUED | COMMUNITY
End: 2019-12-26

## 2019-12-26 RX ORDER — DEXAMETHASONE 0.5 MG/5ML
20 ELIXIR ORAL ONCE
Refills: 0 | Status: COMPLETED | OUTPATIENT
Start: 2019-12-26 | End: 2019-12-26

## 2019-12-26 RX ORDER — AZITHROMYCIN 250 MG/1
250 TABLET, FILM COATED ORAL
Qty: 1 | Refills: 0 | Status: DISCONTINUED | COMMUNITY
Start: 2019-12-06 | End: 2019-12-26

## 2019-12-26 RX ORDER — FAMOTIDINE 10 MG/ML
20 INJECTION INTRAVENOUS ONCE
Refills: 0 | Status: COMPLETED | OUTPATIENT
Start: 2019-12-26 | End: 2019-12-26

## 2019-12-26 RX ADMIN — PACLITAXEL 182.5 MILLIGRAM(S): 6 INJECTION, SOLUTION, CONCENTRATE INTRAVENOUS at 14:30

## 2019-12-26 RX ADMIN — FOSAPREPITANT DIMEGLUMINE 450 MILLIGRAM(S): 150 INJECTION, POWDER, LYOPHILIZED, FOR SOLUTION INTRAVENOUS at 13:30

## 2019-12-26 RX ADMIN — Medication 625 MILLIGRAM(S): at 18:00

## 2019-12-26 RX ADMIN — Medication 20 MILLIGRAM(S): at 13:10

## 2019-12-26 RX ADMIN — Medication 189 MILLIGRAM(S): at 12:55

## 2019-12-26 RX ADMIN — FAMOTIDINE 20 MILLIGRAM(S): 10 INJECTION INTRAVENOUS at 13:30

## 2019-12-26 RX ADMIN — Medication 153 MILLIGRAM(S): at 13:55

## 2019-12-26 RX ADMIN — FOSAPREPITANT DIMEGLUMINE 150 MILLIGRAM(S): 150 INJECTION, POWDER, LYOPHILIZED, FOR SOLUTION INTRAVENOUS at 13:50

## 2019-12-26 RX ADMIN — Medication 312.5 MILLIGRAM(S): at 17:03

## 2019-12-26 RX ADMIN — PACLITAXEL 285 MILLIGRAM(S): 6 INJECTION, SOLUTION, CONCENTRATE INTRAVENOUS at 17:03

## 2019-12-26 RX ADMIN — FAMOTIDINE 156 MILLIGRAM(S): 10 INJECTION INTRAVENOUS at 13:15

## 2019-12-26 RX ADMIN — Medication 50 MILLIGRAM(S): at 14:15

## 2019-12-26 NOTE — CONSULT LETTER
[Dear  ___] : Dear  [unfilled], [Consult Letter:] : I had the pleasure of evaluating your patient, [unfilled]. [Please see my note below.] : Please see my note below. [Consult Closing:] : Thank you very much for allowing me to participate in the care of this patient.  If you have any questions, please do not hesitate to contact me. [Sincerely,] : Sincerely, [DrJeffrey  ___] : Dr. WILBURN [FreeTextEntry3] : Dr. AKILAH Maria, covering for Dr. Herman.

## 2019-12-26 NOTE — ASSESSMENT
[FreeTextEntry1] :  In summary, this is a 59 year old woman with a diagnosis of Stage 1C2 endometrioid adenocarcinoma of ovary, s/p LUCINA/BSO with no residual disease.\par On adjuvant chemotherapy, s/p 5 cycles of Carboplatin and Taxol, tolerating well.\par \par RECOMMENDATIONS:\par Proceed with chemotherapy with carboplatin and Taxol today, cycle 6.\par ANC 1.1 but with somewhat increased monocytes the AGC is slightly above 1500.\par Patient asked to return in 1 week for repeat CBC.\par Follow up with Dr. Herman in 3 weeks.\par \par Side effects of chemotherapy including, but not limited to, nausea, vomiting, myelosuppression, risk of infection, neuropathy, myalgia, alopecia and allergic reactions were discussed.  \par The chemotherapy dose was adjusted according to pt's height, weight, labs and anticipated tolerance.\par The high risk of complications and complexity associated with antineoplastic therapy administration has been explained to the pt and family.\par Chemotherapy will be administered under my direct supervision\par \par Case discussed and patient seen with Dr. Maria who agreed with the above plan.\par \par \par \par

## 2019-12-26 NOTE — REVIEW OF SYSTEMS
[Negative] : Heme/Lymph [Joint Pain] : joint pain [Joint Stiffness] : joint stiffness [FreeTextEntry2] : Never had colonoscopy , last mammogram 2 weeks ago. [FreeTextEntry7] : Anorexia

## 2019-12-26 NOTE — PHYSICAL EXAM
[Fully active, able to carry on all pre-disease performance without restriction] : Status 0 - Fully active, able to carry on all pre-disease performance without restriction [Normal] : affect appropriate [de-identified] : Except mild CVA tenderness [de-identified] : well healed midline infraumbilical scar, mild thickening in the lower part of the scar.

## 2019-12-26 NOTE — HISTORY OF PRESENT ILLNESS
[Disease: _____________________] : Disease: [unfilled] [N: ___] : N[unfilled] [T: ___] : T[unfilled] [M: ___] : M[unfilled] [AJCC Stage: ____] : AJCC Stage: [unfilled] [de-identified] : Endometrioid [de-identified] : 59 yof, LMP at age 49, h/o hypercholesterolemia originally presented to ED in July 2019 for 2 weeks of yellow vaginal discharge and 1 day of light vaginal bleeding.  She saw Dr. Lockhart in [place] and was found to have a pelvic mass and a possible "swollen tube" according to outpatient sonogram and MRI done in [place].  Patient subsequently saw Dr. Mcnally and was scheduled for laparoscopic LUCINA/BSO, which was converted to exploratory laparotomy secondary to multiple adhesions.  \par \par Work up\par Complete Pelvic Sonogram (7/26/2019): The uterus measures 7.4 x 5.6 x 3.8 cm, with normal echogenicity \par and morphology. The endometrial echo complex measures 0.3 cm, which is normal in thickness. A very trace amount of endometrial fluid is seen. Within the right adnexa, extending past the midline, a part-cystic, heterogeneous mass with internal vascularity measures 12.4 x 11.2 x 7.2 cm without a distinguishable native ovary. An associated dilated tubular structure is seen adjacent to this mass, possibly representing hydrosalpinx. Nonspecific nonvisualization of the left ovary. No free fluid in the pelvis.\par \par MRI (6/4/2019): midline complex cystic and solid enhancing lesion likely originating from the ovary/ovaries concerning for cystic ovarian neoplasm. Suspicion of left-sided hydrosalpinx, small intrauterine fibroids.\par \par 7/26/2019:\par CEA 7.2\par CA 19-9 693 \par CA-125 88\par \par  \par Diag lap: LUCINA/BSO 8/12/19\par FINDINGS\par large left adnexal mass, around 6cm in size, friable, large right adnexal mass around 8 cm in size, no normal ovary identified bilaterally, right mass completely adherent to right pelvic side wall, unable to dissect it off safely, unable to identify IP ligament on right side.\par  Post surgery: no gross residual\par \par PATHOLOGY\par 1. Uterus, cervix, bilateral tubes and ovaries, total\par hysterectomy and bilateral salpingo-oophorectomy:\par - Endometrioid adenocarcinoma, of the right ovary, 7 cm,\par moderately differentiated, involving the ovarian surface.\par - Left fallopian tube, involved by carcinoma.\par - Right fallopian tube and paratubal tissue with endometriosis;\par no carcinoma seen.\par - Left ovary with foci of endometriosis; no carcinoma seen.\par - Uterine corpus with leiomyoma and adenomyosis; no carcinoma\par seen.\par - Cervix with Nabothian cysts and mild chronic cervicitis; no\par carcinoma seen.\par - Pathologic staging: pT1c2, pN0, pMx\par \par Pt was started on adjuvant chemotherapy on 9/11/19 with Carboplatin and Taxol\par  [de-identified] : Pt has had an uneventful post op course.\par No abdominal pain, bowel abnormalities.\par Appetite is improving.\par No vaginal bleeding or discharge\par \par 10/3/19\par Pt is here for follow up.\par She is s/p 1 cycle of Carboplatin/Taxol\par She took nausea meds for 2-3 days, although she was not nauseous.\par She however was constipated x 4 days, which was relieved with Colace and Dulcolax.\par No vaginal bleeding.\par Has twinges of pain in RUQ and at the site of surgery.\par no fever or mouth sores.\par \par 10/21/19\par Pt is here for follow up.\par Tolerating chemo well.\par No significant nausea or vomiting. C/O GERD like symptoms.\par No abd pain, vag bleeding\par no fever or mouth sores.\par C/O scalp pain, hair is falling.\par 11/11/19\par \par pt is here for follow up.\par C/O having had nausea in the first week with improvement  after she takes nausea meds,\par No fever, mouth sores or diarrhea\par \par 12/2/19\par Pt is here for follow up.\par No new complaints.\par Tolerated chemo cycle #4 well.\par No N, V, D, fever\par No mouth sores, abdominal pain.\par no neuropathy\par \par \par 12/26/19\par Patient here for follow up visit, feeling well.  She is scheduled for cycle 6 Carbo/Taxol today.  She's c/o of decreased appetite after last treatment.  She also has had some right mid back discomfort since the last chemo as well, however it has been improving slowly.  Patient denies cough, shortness of breath, denies fever, denies bone pain. Patient denies abdominal pain or bloating, denies vaginal bleeding or discharge.\par \par   [de-identified] : Normal nuclear expression of all proteins is seen in tumor cell\par nuclei.  There is no evidence of mismatch repair deficiency by\par immunohistochemical evaluation.\par Final Diagnosis\par 1. Uterus, cervix, bilateral tubes and ovaries, total\par hysterectomy and bilateral salpingo-oophorectomy:\par - Endometrioid adenocarcinoma, of the right ovary, 7 cm,\par moderately differentiated, involving the ovarian surface.\par - Left fallopian tube, involved by carcinoma.\par - Right fallopian tube and paratubal tissue with endometriosis;\par no carcinoma seen.\par - Left ovary with foci of endometriosis; no carcinoma seen.\par - Uterine corpus with leiomyoma and adenomyosis; no carcinoma\par seen.\par - Cervix with Nabothian cysts and mild chronic cervicitis; no\par carcinoma seen.\par - Pathologic staging: pT1c2, pN0, pMx\par \par 2. Omentum tissue, omentectomy:\par -  Fibroadipose tissue; no carcinoma seen.\par \par 3. Appendix, appendectomy:\par - Appendix with acute periappendicitis; no carcinoma seen.\par \par 4. Lymph node, right para-aortic, dissection:\par - Three lymph nodes, no carcinoma seen (0/3).\par \par 5. Lymph node, left common iliac, dissection:\par -  Nine lymph nodes, no carcinoma seen (0/9).\par \par 6. Lymph node, left pelvic, dissection:\par -  Twelve lymph nodes, no carcinoma seen (0/12).\par \par 7. Lymph node, left , dissection:\par - Six lymph nodes, no carcinoma seen (0/6).\par

## 2020-01-02 ENCOUNTER — OUTPATIENT (OUTPATIENT)
Dept: OUTPATIENT SERVICES | Facility: HOSPITAL | Age: 60
LOS: 1 days | Discharge: HOME | End: 2020-01-02

## 2020-01-02 ENCOUNTER — LABORATORY RESULT (OUTPATIENT)
Age: 60
End: 2020-01-02

## 2020-01-02 ENCOUNTER — APPOINTMENT (OUTPATIENT)
Dept: HEMATOLOGY ONCOLOGY | Facility: CLINIC | Age: 60
End: 2020-01-02

## 2020-01-02 DIAGNOSIS — C56.9 MALIGNANT NEOPLASM OF UNSPECIFIED OVARY: ICD-10-CM

## 2020-01-02 DIAGNOSIS — K02.9 DENTAL CARIES, UNSPECIFIED: Chronic | ICD-10-CM

## 2020-01-02 DIAGNOSIS — Z51.11 ENCOUNTER FOR ANTINEOPLASTIC CHEMOTHERAPY: ICD-10-CM

## 2020-01-07 LAB
HCT VFR BLD CALC: 31 %
HCT VFR BLD CALC: 32.6 %
HGB BLD-MCNC: 10.4 G/DL
HGB BLD-MCNC: 10.7 G/DL
MCHC RBC-ENTMCNC: 28 PG
MCHC RBC-ENTMCNC: 28.1 PG
MCHC RBC-ENTMCNC: 32.8 G/DL
MCHC RBC-ENTMCNC: 33.5 G/DL
MCV RBC AUTO: 83.6 FL
MCV RBC AUTO: 85.6 FL
PLATELET # BLD AUTO: 147 K/UL
PLATELET # BLD AUTO: 172 K/UL
PMV BLD: 10.4 FL
PMV BLD: 10.5 FL
RBC # BLD: 3.71 M/UL
RBC # BLD: 3.81 M/UL
RBC # FLD: 15.8 %
RBC # FLD: 17.2 %
WBC # FLD AUTO: 3.14 K/UL
WBC # FLD AUTO: 3.95 K/UL

## 2020-01-20 ENCOUNTER — OUTPATIENT (OUTPATIENT)
Dept: OUTPATIENT SERVICES | Facility: HOSPITAL | Age: 60
LOS: 1 days | Discharge: HOME | End: 2020-01-20

## 2020-01-20 ENCOUNTER — LABORATORY RESULT (OUTPATIENT)
Age: 60
End: 2020-01-20

## 2020-01-20 ENCOUNTER — APPOINTMENT (OUTPATIENT)
Dept: HEMATOLOGY ONCOLOGY | Facility: CLINIC | Age: 60
End: 2020-01-20
Payer: COMMERCIAL

## 2020-01-20 VITALS
SYSTOLIC BLOOD PRESSURE: 135 MMHG | HEIGHT: 59 IN | HEART RATE: 82 BPM | BODY MASS INDEX: 29.23 KG/M2 | DIASTOLIC BLOOD PRESSURE: 77 MMHG | TEMPERATURE: 97.4 F | WEIGHT: 145 LBS

## 2020-01-20 DIAGNOSIS — K02.9 DENTAL CARIES, UNSPECIFIED: Chronic | ICD-10-CM

## 2020-01-20 LAB
HCT VFR BLD CALC: 31.8 %
HGB BLD-MCNC: 10.4 G/DL
MCHC RBC-ENTMCNC: 29 PG
MCHC RBC-ENTMCNC: 32.7 G/DL
MCV RBC AUTO: 88.6 FL
PLATELET # BLD AUTO: 221 K/UL
PMV BLD: 9.5 FL
RBC # BLD: 3.59 M/UL
RBC # FLD: 16.8 %
WBC # FLD AUTO: 4.21 K/UL

## 2020-01-20 PROCEDURE — 99214 OFFICE O/P EST MOD 30 MIN: CPT

## 2020-01-20 NOTE — PHYSICAL EXAM
[Fully active, able to carry on all pre-disease performance without restriction] : Status 0 - Fully active, able to carry on all pre-disease performance without restriction [Normal] : affect appropriate [de-identified] : well healed midline infraumbilical scar, mild thickening in the lower part of the scar.

## 2020-01-20 NOTE — CONSULT LETTER
[Please see my note below.] : Please see my note below. [Consult Closing:] : Thank you very much for allowing me to participate in the care of this patient.  If you have any questions, please do not hesitate to contact me. [Consult Letter:] : I had the pleasure of evaluating your patient, [unfilled]. [Dear  ___] : Dear  [unfilled], [Sincerely,] : Sincerely, [DrJeffrey  ___] : Dr. WILBURN [FreeTextEntry3] : Vanessa Herman MD

## 2020-01-20 NOTE — HISTORY OF PRESENT ILLNESS
[Disease: _____________________] : Disease: [unfilled] [T: ___] : T[unfilled] [N: ___] : N[unfilled] [M: ___] : M[unfilled] [AJCC Stage: ____] : AJCC Stage: [unfilled] [de-identified] : 59 yof, LMP at age 49, h/o hypercholesterolemia originally presented to ED in July 2019 for 2 weeks of yellow vaginal discharge and 1 day of light vaginal bleeding.  She saw Dr. Lockhart in [place] and was found to have a pelvic mass and a possible "swollen tube" according to outpatient sonogram and MRI done in [place].  Patient subsequently saw Dr. Mcnally and was scheduled for laparoscopic LUCINA/BSO, which was converted to exploratory laparotomy secondary to multiple adhesions.  \par \par Work up\par Complete Pelvic Sonogram (7/26/2019): The uterus measures 7.4 x 5.6 x 3.8 cm, with normal echogenicity \par and morphology. The endometrial echo complex measures 0.3 cm, which is normal in thickness. A very trace amount of endometrial fluid is seen. Within the right adnexa, extending past the midline, a part-cystic, heterogeneous mass with internal vascularity measures 12.4 x 11.2 x 7.2 cm without a distinguishable native ovary. An associated dilated tubular structure is seen adjacent to this mass, possibly representing hydrosalpinx. Nonspecific nonvisualization of the left ovary. No free fluid in the pelvis.\par \par MRI (6/4/2019): midline complex cystic and solid enhancing lesion likely originating from the ovary/ovaries concerning for cystic ovarian neoplasm. Suspicion of left-sided hydrosalpinx, small intrauterine fibroids.\par \par 7/26/2019:\par CEA 7.2\par CA 19-9 693 \par CA-125 88\par \par  \par Diag lap: LUCINA/BSO 8/12/19\par FINDINGS\par large left adnexal mass, around 6cm in size, friable, large right adnexal mass around 8 cm in size, no normal ovary identified bilaterally, right mass completely adherent to right pelvic side wall, unable to dissect it off safely, unable to identify IP ligament on right side.\par  Post surgery: no gross residual\par \par PATHOLOGY\par 1. Uterus, cervix, bilateral tubes and ovaries, total\par hysterectomy and bilateral salpingo-oophorectomy:\par - Endometrioid adenocarcinoma, of the right ovary, 7 cm,\par moderately differentiated, involving the ovarian surface.\par - Left fallopian tube, involved by carcinoma.\par - Right fallopian tube and paratubal tissue with endometriosis;\par no carcinoma seen.\par - Left ovary with foci of endometriosis; no carcinoma seen.\par - Uterine corpus with leiomyoma and adenomyosis; no carcinoma\par seen.\par - Cervix with Nabothian cysts and mild chronic cervicitis; no\par carcinoma seen.\par - Pathologic staging: pT1c2, pN0, pMx\par \par Pt was started on adjuvant chemotherapy on 9/11/19 with Carboplatin and Taxol\par  [de-identified] : Endometrioid [de-identified] : Normal nuclear expression of all proteins is seen in tumor cell\par nuclei.  There is no evidence of mismatch repair deficiency by\par immunohistochemical evaluation.\par Final Diagnosis\par 1. Uterus, cervix, bilateral tubes and ovaries, total\par hysterectomy and bilateral salpingo-oophorectomy:\par - Endometrioid adenocarcinoma, of the right ovary, 7 cm,\par moderately differentiated, involving the ovarian surface.\par - Left fallopian tube, involved by carcinoma.\par - Right fallopian tube and paratubal tissue with endometriosis;\par no carcinoma seen.\par - Left ovary with foci of endometriosis; no carcinoma seen.\par - Uterine corpus with leiomyoma and adenomyosis; no carcinoma\par seen.\par - Cervix with Nabothian cysts and mild chronic cervicitis; no\par carcinoma seen.\par - Pathologic staging: pT1c2, pN0, pMx\par \par 2. Omentum tissue, omentectomy:\par -  Fibroadipose tissue; no carcinoma seen.\par \par 3. Appendix, appendectomy:\par - Appendix with acute periappendicitis; no carcinoma seen.\par \par 4. Lymph node, right para-aortic, dissection:\par - Three lymph nodes, no carcinoma seen (0/3).\par \par 5. Lymph node, left common iliac, dissection:\par -  Nine lymph nodes, no carcinoma seen (0/9).\par \par 6. Lymph node, left pelvic, dissection:\par -  Twelve lymph nodes, no carcinoma seen (0/12).\par \par 7. Lymph node, left , dissection:\par - Six lymph nodes, no carcinoma seen (0/6).\par  [de-identified] : Pt has had an uneventful post op course.\par No abdominal pain, bowel abnormalities.\par Appetite is improving.\par No vaginal bleeding or discharge\par \par 10/3/19\par Pt is here for follow up.\par She is s/p 1 cycle of Carboplatin/Taxol\par She took nausea meds for 2-3 days, although she was not nauseous.\par She however was constipated x 4 days, which was relieved with Colace and Dulcolax.\par No vaginal bleeding.\par Has twinges of pain in RUQ and at the site of surgery.\par no fever or mouth sores.\par \par 10/21/19\par Pt is here for follow up.\par Tolerating chemo well.\par No significant nausea or vomiting. C/O GERD like symptoms.\par No abd pain, vag bleeding\par no fever or mouth sores.\par C/O scalp pain, hair is falling.\par 11/11/19\par \par pt is here for follow up.\par C/O having had nausea in the first week with improvement  after she takes nausea meds,\par No fever, mouth sores or diarrhea\par \par 12/2/19\par Pt is here for follow up.\par No new complaints.\par Tolerated chemo cycle #4 well.\par No N, V, D, fever\par No mouth sores, abdominal pain.\par no neuropathy\par \par 1/20/20\par Pt is here for follow up.\par Completed 6 cycles of Carboplatin and Taxol in 12/19.\par C/O mild tingling and numbness in her fingers and toes.\par Also had rectal bleeding a few drops post constipation ? hemorrhoids.\par No fever, abdominal pain nausea or vomiting.\par C/O hot flashes and sweats.

## 2020-01-20 NOTE — ASSESSMENT
[FreeTextEntry1] :  In summary this is a 59 year old woman with a diagnosis of Stage 1C2 endometrioid adenocarcinoma of ovary s/p LUCINA/BSO with no residual disease.\par s/p 6 cycles of Carboplatin and Taxol, tolerated it well\par \par RECOMMENDATIONS\par I had a long discussion with the pt regarding follow up of early stage ovarian cancer.\par Signs and symptoms of recurrence were discussed.\par She will follow up with me Q 3 months.\par Follow up with GYN ONC was also recommended.\par She has never undergone a  colonoscopy, GI referral given\par Labs ordered.\par \par All of their questions and concerns were addressed\par

## 2020-01-23 LAB
ALBUMIN SERPL ELPH-MCNC: 4.4 G/DL
ALP BLD-CCNC: 93 U/L
ALT SERPL-CCNC: 12 U/L
ANION GAP SERPL CALC-SCNC: 14 MMOL/L
AST SERPL-CCNC: 19 U/L
BILIRUB SERPL-MCNC: <0.2 MG/DL
BUN SERPL-MCNC: 15 MG/DL
CALCIUM SERPL-MCNC: 9.6 MG/DL
CANCER AG125 SERPL-ACNC: 10 U/ML
CHLORIDE SERPL-SCNC: 101 MMOL/L
CO2 SERPL-SCNC: 25 MMOL/L
CREAT SERPL-MCNC: 0.6 MG/DL
GLUCOSE SERPL-MCNC: 98 MG/DL
POTASSIUM SERPL-SCNC: 4.4 MMOL/L
PROT SERPL-MCNC: 7.1 G/DL
SODIUM SERPL-SCNC: 140 MMOL/L

## 2020-01-28 ENCOUNTER — APPOINTMENT (OUTPATIENT)
Dept: GYNECOLOGIC ONCOLOGY | Facility: CLINIC | Age: 60
End: 2020-01-28

## 2020-01-30 DIAGNOSIS — C56.9 MALIGNANT NEOPLASM OF UNSPECIFIED OVARY: ICD-10-CM

## 2020-02-21 ENCOUNTER — APPOINTMENT (OUTPATIENT)
Dept: GYNECOLOGIC ONCOLOGY | Facility: CLINIC | Age: 60
End: 2020-02-21
Payer: COMMERCIAL

## 2020-02-21 VITALS
SYSTOLIC BLOOD PRESSURE: 124 MMHG | DIASTOLIC BLOOD PRESSURE: 86 MMHG | WEIGHT: 145 LBS | HEIGHT: 59 IN | BODY MASS INDEX: 29.23 KG/M2 | TEMPERATURE: 98.4 F

## 2020-02-21 PROCEDURE — 99213 OFFICE O/P EST LOW 20 MIN: CPT

## 2020-02-21 NOTE — HISTORY OF PRESENT ILLNESS
[FreeTextEntry1] : 59-year-old female with stage ICII endometrioid carcinoma involving the right ovary and left fallopian tube status post surgical staging August 12, 2019. Completed 6 cycles of Carboplatin and Taxol in 12/19. Her last CA-125 as of  January was less than 10. She is recovering well with no clinical evidence of disease.  \par \par \par

## 2020-02-21 NOTE — ASSESSMENT
[FreeTextEntry1] : 59-year-old female with stage ICII endometrioid carcinoma involving the right ovary and left fallopian tube status post surgical staging August 12, 2019. Completed 6 cycles of Carboplatin and Taxol in 12/19. Her last CA-125 as of  January was less than 10. She is recovering well with no clinical evidence of disease.  The patient will be setting up an appointment with Dr. Bañuelos for further management. She is to return for followup with me in 3 months. \par \par PLAN\par -F/U Visit in 3 months \par \par \par

## 2020-06-09 ENCOUNTER — APPOINTMENT (OUTPATIENT)
Dept: GYNECOLOGIC ONCOLOGY | Facility: CLINIC | Age: 60
End: 2020-06-09

## 2020-07-06 ENCOUNTER — APPOINTMENT (OUTPATIENT)
Dept: HEMATOLOGY ONCOLOGY | Facility: CLINIC | Age: 60
End: 2020-07-06

## 2020-07-23 NOTE — HISTORY OF PRESENT ILLNESS
[FreeTextEntry1] : 59 year old patient here for followup.   Underwent LUCINA/BSO, tumor debulking and surgical staging in August 2019 for endometroid carcinoma involving the right ovary and left fallopian tube. Stage 1CII.  She received 6 cycles of carboplatin /taxol with Dr. Herman.  Last CA-125 was in January 2020 and was 10.  She was unable to repeat labwork due to COVID 19.   She will have CA-125.  She has no complaints of vaginal bleeding or discharge.

## 2020-07-24 ENCOUNTER — APPOINTMENT (OUTPATIENT)
Dept: GYNECOLOGIC ONCOLOGY | Facility: CLINIC | Age: 60
End: 2020-07-24

## 2020-08-17 ENCOUNTER — APPOINTMENT (OUTPATIENT)
Dept: HEMATOLOGY ONCOLOGY | Facility: CLINIC | Age: 60
End: 2020-08-17
Payer: COMMERCIAL

## 2020-08-17 ENCOUNTER — LABORATORY RESULT (OUTPATIENT)
Age: 60
End: 2020-08-17

## 2020-08-17 VITALS
HEART RATE: 68 BPM | HEIGHT: 59 IN | DIASTOLIC BLOOD PRESSURE: 69 MMHG | BODY MASS INDEX: 30.04 KG/M2 | SYSTOLIC BLOOD PRESSURE: 129 MMHG | TEMPERATURE: 98 F | WEIGHT: 149 LBS

## 2020-08-17 DIAGNOSIS — Z51.11 ENCOUNTER FOR ANTINEOPLASTIC CHEMOTHERAPY: ICD-10-CM

## 2020-08-17 PROCEDURE — 99213 OFFICE O/P EST LOW 20 MIN: CPT

## 2020-08-17 RX ORDER — ONDANSETRON 8 MG/1
8 TABLET, ORALLY DISINTEGRATING ORAL EVERY 8 HOURS
Qty: 30 | Refills: 3 | Status: DISCONTINUED | COMMUNITY
Start: 2019-09-05 | End: 2020-08-17

## 2020-08-17 RX ORDER — PROCHLORPERAZINE MALEATE 10 MG/1
10 TABLET ORAL EVERY 6 HOURS
Qty: 90 | Refills: 2 | Status: DISCONTINUED | COMMUNITY
Start: 2019-09-05 | End: 2020-08-17

## 2020-08-17 RX ORDER — OMEPRAZOLE MAGNESIUM 20 MG/1
20 TABLET, DELAYED RELEASE ORAL DAILY
Qty: 30 | Refills: 3 | Status: DISCONTINUED | COMMUNITY
Start: 2019-10-21 | End: 2020-08-17

## 2020-08-17 RX ORDER — OMEPRAZOLE 40 MG/1
40 CAPSULE, DELAYED RELEASE ORAL
Qty: 30 | Refills: 1 | Status: DISCONTINUED | COMMUNITY
Start: 2019-10-25 | End: 2020-08-17

## 2020-08-17 NOTE — PHYSICAL EXAM
[Fully active, able to carry on all pre-disease performance without restriction] : Status 0 - Fully active, able to carry on all pre-disease performance without restriction [Normal] : grossly intact [de-identified] : well healed midline infraumbilical scar,

## 2020-08-17 NOTE — ASSESSMENT
[FreeTextEntry1] :  In summary this is a 60 year old woman with a diagnosis of Stage 1C2 endometrioid adenocarcinoma of ovary s/p LUCINA/BSO with no residual disease.\par s/p 6 cycles of Carboplatin and Taxol, tolerated it well, completed in 12/19\par LULA\par \par RECOMMENDATIONS\par I had a long discussion with the pt regarding follow up of early stage ovarian cancer.\par Signs and symptoms of recurrence were discussed.\par She will follow up with me Q 4 months.\par Follow up with GYN ONC as recommended.\par She has never undergone a  colonoscopy, GI referral given again.\par Labs ordered.\par \par

## 2020-08-17 NOTE — HISTORY OF PRESENT ILLNESS
[Disease: _____________________] : Disease: [unfilled] [T: ___] : T[unfilled] [N: ___] : N[unfilled] [M: ___] : M[unfilled] [AJCC Stage: ____] : AJCC Stage: [unfilled] [de-identified] : Endometrioid [de-identified] : 59 yof, LMP at age 49, h/o hypercholesterolemia originally presented to ED in July 2019 for 2 weeks of yellow vaginal discharge and 1 day of light vaginal bleeding.  She saw Dr. Lockhart in [place] and was found to have a pelvic mass and a possible "swollen tube" according to outpatient sonogram and MRI done in [place].  Patient subsequently saw Dr. Mcnally and was scheduled for laparoscopic LUCINA/BSO, which was converted to exploratory laparotomy secondary to multiple adhesions.  \par \par Work up\par Complete Pelvic Sonogram (7/26/2019): The uterus measures 7.4 x 5.6 x 3.8 cm, with normal echogenicity \par and morphology. The endometrial echo complex measures 0.3 cm, which is normal in thickness. A very trace amount of endometrial fluid is seen. Within the right adnexa, extending past the midline, a part-cystic, heterogeneous mass with internal vascularity measures 12.4 x 11.2 x 7.2 cm without a distinguishable native ovary. An associated dilated tubular structure is seen adjacent to this mass, possibly representing hydrosalpinx. Nonspecific nonvisualization of the left ovary. No free fluid in the pelvis.\par \par MRI (6/4/2019): midline complex cystic and solid enhancing lesion likely originating from the ovary/ovaries concerning for cystic ovarian neoplasm. Suspicion of left-sided hydrosalpinx, small intrauterine fibroids.\par \par 7/26/2019:\par CEA 7.2\par CA 19-9 693 \par CA-125 88\par \par  \par Diag lap: LUCINA/BSO 8/12/19\par FINDINGS\par large left adnexal mass, around 6cm in size, friable, large right adnexal mass around 8 cm in size, no normal ovary identified bilaterally, right mass completely adherent to right pelvic side wall, unable to dissect it off safely, unable to identify IP ligament on right side.\par  Post surgery: no gross residual\par \par PATHOLOGY\par 1. Uterus, cervix, bilateral tubes and ovaries, total\par hysterectomy and bilateral salpingo-oophorectomy:\par - Endometrioid adenocarcinoma, of the right ovary, 7 cm,\par moderately differentiated, involving the ovarian surface.\par - Left fallopian tube, involved by carcinoma.\par - Right fallopian tube and paratubal tissue with endometriosis;\par no carcinoma seen.\par - Left ovary with foci of endometriosis; no carcinoma seen.\par - Uterine corpus with leiomyoma and adenomyosis; no carcinoma\par seen.\par - Cervix with Nabothian cysts and mild chronic cervicitis; no\par carcinoma seen.\par - Pathologic staging: pT1c2, pN0, pMx\par \par Pt was started on adjuvant chemotherapy on 9/11/19 with Carboplatin and Taxol\par  [de-identified] : Normal nuclear expression of all proteins is seen in tumor cell\par nuclei.  There is no evidence of mismatch repair deficiency by\par immunohistochemical evaluation.\par Final Diagnosis\par 1. Uterus, cervix, bilateral tubes and ovaries, total\par hysterectomy and bilateral salpingo-oophorectomy:\par - Endometrioid adenocarcinoma, of the right ovary, 7 cm,\par moderately differentiated, involving the ovarian surface.\par - Left fallopian tube, involved by carcinoma.\par - Right fallopian tube and paratubal tissue with endometriosis;\par no carcinoma seen.\par - Left ovary with foci of endometriosis; no carcinoma seen.\par - Uterine corpus with leiomyoma and adenomyosis; no carcinoma\par seen.\par - Cervix with Nabothian cysts and mild chronic cervicitis; no\par carcinoma seen.\par - Pathologic staging: pT1c2, pN0, pMx\par \par 2. Omentum tissue, omentectomy:\par -  Fibroadipose tissue; no carcinoma seen.\par \par 3. Appendix, appendectomy:\par - Appendix with acute periappendicitis; no carcinoma seen.\par \par 4. Lymph node, right para-aortic, dissection:\par - Three lymph nodes, no carcinoma seen (0/3).\par \par 5. Lymph node, left common iliac, dissection:\par -  Nine lymph nodes, no carcinoma seen (0/9).\par \par 6. Lymph node, left pelvic, dissection:\par -  Twelve lymph nodes, no carcinoma seen (0/12).\par \par 7. Lymph node, left , dissection:\par - Six lymph nodes, no carcinoma seen (0/6).\par  [de-identified] : Pt has had an uneventful post op course.\par No abdominal pain, bowel abnormalities.\par Appetite is improving.\par No vaginal bleeding or discharge\par \par 10/3/19\par Pt is here for follow up.\par She is s/p 1 cycle of Carboplatin/Taxol\par She took nausea meds for 2-3 days, although she was not nauseous.\par She however was constipated x 4 days, which was relieved with Colace and Dulcolax.\par No vaginal bleeding.\par Has twinges of pain in RUQ and at the site of surgery.\par no fever or mouth sores.\par \par 10/21/19\par Pt is here for follow up.\par Tolerating chemo well.\par No significant nausea or vomiting. C/O GERD like symptoms.\par No abd pain, vag bleeding\par no fever or mouth sores.\par C/O scalp pain, hair is falling.\par 11/11/19\par \par pt is here for follow up.\par C/O having had nausea in the first week with improvement  after she takes nausea meds,\par No fever, mouth sores or diarrhea\par \par 12/2/19\par Pt is here for follow up.\par No new complaints.\par Tolerated chemo cycle #4 well.\par No N, V, D, fever\par No mouth sores, abdominal pain.\par no neuropathy\par \par 1/20/20\par Pt is here for follow up.\par Completed 6 cycles of Carboplatin and Taxol in 12/19.\par C/O mild tingling and numbness in her fingers and toes.\par Also had rectal bleeding a few drops post constipation ? hemorrhoids.\par No fever, abdominal pain nausea or vomiting.\par C/O hot flashes and sweats.\par \par 8/17/20\par Pt is here for follow up.\par No new complaints. Feels well.\par No abd pain, N, V, d\par No vaginal bleeding\par Has not followed here for over 6 mths\par

## 2020-08-18 LAB
ALBUMIN SERPL ELPH-MCNC: 4.7 G/DL
ALP BLD-CCNC: 80 U/L
ALT SERPL-CCNC: 13 U/L
ANION GAP SERPL CALC-SCNC: 14 MMOL/L
AST SERPL-CCNC: 20 U/L
BILIRUB SERPL-MCNC: 0.4 MG/DL
BUN SERPL-MCNC: 12 MG/DL
CALCIUM SERPL-MCNC: 9.8 MG/DL
CANCER AG125 SERPL-ACNC: 9 U/ML
CHLORIDE SERPL-SCNC: 103 MMOL/L
CO2 SERPL-SCNC: 23 MMOL/L
CREAT SERPL-MCNC: 0.7 MG/DL
GLUCOSE SERPL-MCNC: 83 MG/DL
HCT VFR BLD CALC: 42 %
HGB BLD-MCNC: 13.3 G/DL
MCHC RBC-ENTMCNC: 27.1 PG
MCHC RBC-ENTMCNC: 31.7 G/DL
MCV RBC AUTO: 85.7 FL
PLATELET # BLD AUTO: 225 K/UL
PMV BLD: 10 FL
POTASSIUM SERPL-SCNC: 4.1 MMOL/L
PROT SERPL-MCNC: 7.4 G/DL
RBC # BLD: 4.9 M/UL
RBC # FLD: 13.2 %
SODIUM SERPL-SCNC: 140 MMOL/L
WBC # FLD AUTO: 5.33 K/UL

## 2020-09-18 NOTE — OB HISTORY
[Total Preg ___] : : [unfilled] [Full Term ___] : [unfilled] (full-term) [Living ___] : [unfilled] (living) [Vaginal ___] : [unfilled] vaginal delivery(s)

## 2020-09-22 ENCOUNTER — APPOINTMENT (OUTPATIENT)
Dept: GYNECOLOGIC ONCOLOGY | Facility: CLINIC | Age: 60
End: 2020-09-22

## 2020-11-03 ENCOUNTER — APPOINTMENT (OUTPATIENT)
Dept: GASTROENTEROLOGY | Facility: CLINIC | Age: 60
End: 2020-11-03

## 2020-11-10 ENCOUNTER — APPOINTMENT (OUTPATIENT)
Dept: GYNECOLOGIC ONCOLOGY | Facility: CLINIC | Age: 60
End: 2020-11-10
Payer: COMMERCIAL

## 2020-11-10 VITALS
WEIGHT: 149 LBS | HEIGHT: 59 IN | BODY MASS INDEX: 30.04 KG/M2 | DIASTOLIC BLOOD PRESSURE: 82 MMHG | SYSTOLIC BLOOD PRESSURE: 138 MMHG | TEMPERATURE: 98.4 F

## 2020-11-10 PROCEDURE — 99213 OFFICE O/P EST LOW 20 MIN: CPT

## 2020-11-10 PROCEDURE — 99072 ADDL SUPL MATRL&STAF TM PHE: CPT

## 2020-11-10 NOTE — ASSESSMENT
[FreeTextEntry1] : 60 year old patient s/p LUCINA/BSO with surgical staging on August 12, 2019 for Stage 1C2 Endometrioid adenocarcinoma of ovary. She received 6 cycles of carboplatin/taxol with Dr. Herman completed in 12/19 and has been w/[facility]. She present for follow up examination.  The patient appears to be doing well with no clinical evidence of disease.\par \par

## 2020-12-28 ENCOUNTER — APPOINTMENT (OUTPATIENT)
Dept: HEMATOLOGY ONCOLOGY | Facility: CLINIC | Age: 60
End: 2020-12-28

## 2021-02-09 ENCOUNTER — OUTPATIENT (OUTPATIENT)
Dept: OUTPATIENT SERVICES | Facility: HOSPITAL | Age: 61
LOS: 1 days | Discharge: HOME | End: 2021-02-09

## 2021-02-09 ENCOUNTER — APPOINTMENT (OUTPATIENT)
Dept: GYNECOLOGIC ONCOLOGY | Facility: CLINIC | Age: 61
End: 2021-02-09
Payer: COMMERCIAL

## 2021-02-09 ENCOUNTER — LABORATORY RESULT (OUTPATIENT)
Age: 61
End: 2021-02-09

## 2021-02-09 ENCOUNTER — APPOINTMENT (OUTPATIENT)
Dept: HEMATOLOGY ONCOLOGY | Facility: CLINIC | Age: 61
End: 2021-02-09
Payer: COMMERCIAL

## 2021-02-09 VITALS
TEMPERATURE: 97.8 F | HEIGHT: 59 IN | WEIGHT: 154 LBS | SYSTOLIC BLOOD PRESSURE: 130 MMHG | BODY MASS INDEX: 31.04 KG/M2 | DIASTOLIC BLOOD PRESSURE: 80 MMHG

## 2021-02-09 VITALS
HEART RATE: 85 BPM | HEIGHT: 59 IN | BODY MASS INDEX: 31.04 KG/M2 | TEMPERATURE: 98.3 F | DIASTOLIC BLOOD PRESSURE: 67 MMHG | WEIGHT: 154 LBS | SYSTOLIC BLOOD PRESSURE: 126 MMHG

## 2021-02-09 DIAGNOSIS — K02.9 DENTAL CARIES, UNSPECIFIED: Chronic | ICD-10-CM

## 2021-02-09 DIAGNOSIS — C56.9 MALIGNANT NEOPLASM OF UNSPECIFIED OVARY: ICD-10-CM

## 2021-02-09 DIAGNOSIS — Z51.11 ENCOUNTER FOR ANTINEOPLASTIC CHEMOTHERAPY: ICD-10-CM

## 2021-02-09 PROCEDURE — 99213 OFFICE O/P EST LOW 20 MIN: CPT

## 2021-02-09 PROCEDURE — 99072 ADDL SUPL MATRL&STAF TM PHE: CPT

## 2021-02-09 NOTE — HISTORY OF PRESENT ILLNESS
[FreeTextEntry1] : 60 year old patient presents for follow up.   ( x2)   S/P LUCINA/BSO for ovarian cancer stage 1C on 2019    She completed 6 cycles of carbo/taxol with Dr. Herman.    Terrence saw Dr. Herman earlier today and repeat tumor markers drawn and sent to lab.   She has been [facility] since completing treatment.

## 2021-02-09 NOTE — HISTORY OF PRESENT ILLNESS
[FreeTextEntry1] : 60 year old patient  (NSVDx2), s/p LUCINA/BSO surgical staging for Ovarian Cancer Stage 1C. The patient received 6 cycles of carbo/taxol with Dr. Herman and has been with [facility] since completion of her chemotherapy.   She was seen by Dr. Herman earlier today at which time CA-125 was drawn, result is pending.   Last CA-125 was 9 in 2020,   She denies any vaginal bleeding, discharge or pain.

## 2021-02-09 NOTE — ASSESSMENT
[FreeTextEntry1] : 60 year old patient  (NSVDx2), s/p LUCINA/BSO surgical staging for Ovarian Cancer Stage 1C. The patient received 6 cycles of carbo/taxol with Dr. Herman and has been with [facility] since completion of her chemotherapy.   She was seen by Dr. Herman earlier today at which time CA-125 was drawn, result is pending.   Last CA-125 was 9 in 2020,   She denies any vaginal bleeding, discharge or pain.\par

## 2021-02-09 NOTE — PAST MEDICAL HISTORY
[Menarche Age ____] : age at menarche was [unfilled] [Total Preg ___] : G[unfilled] [Live Births ___] : P[unfilled]  [Full Term ___] : Full Term: [unfilled]

## 2021-02-09 NOTE — PAST MEDICAL HISTORY
[Total Preg ___] : G[unfilled] [Live Births ___] : P[unfilled]  [Full Term ___] : Full Term: [unfilled] [Living ___] : Living: [unfilled] [FreeTextEntry5] : LUCINA/BSO for Ovarian Cancer Stage 1C

## 2021-02-11 LAB
ALBUMIN SERPL ELPH-MCNC: 4.3 G/DL
ALP BLD-CCNC: 87 U/L
ALT SERPL-CCNC: 15 U/L
ANION GAP SERPL CALC-SCNC: 11 MMOL/L
AST SERPL-CCNC: 19 U/L
BILIRUB SERPL-MCNC: 0.2 MG/DL
BUN SERPL-MCNC: 17 MG/DL
CALCIUM SERPL-MCNC: 9.8 MG/DL
CANCER AG125 SERPL-ACNC: 9 U/ML
CHLORIDE SERPL-SCNC: 103 MMOL/L
CO2 SERPL-SCNC: 25 MMOL/L
CREAT SERPL-MCNC: 0.7 MG/DL
GLUCOSE SERPL-MCNC: 108 MG/DL
HCT VFR BLD CALC: 40.3 %
HGB BLD-MCNC: 13.2 G/DL
MCHC RBC-ENTMCNC: 27.6 PG
MCHC RBC-ENTMCNC: 32.8 G/DL
MCV RBC AUTO: 84.1 FL
PLATELET # BLD AUTO: 241 K/UL
PMV BLD: 9.6 FL
POTASSIUM SERPL-SCNC: 4.2 MMOL/L
PROT SERPL-MCNC: 6.9 G/DL
RBC # BLD: 4.79 M/UL
RBC # FLD: 13.3 %
SODIUM SERPL-SCNC: 139 MMOL/L
WBC # FLD AUTO: 4.66 K/UL

## 2021-02-12 NOTE — PHYSICAL EXAM
[Fully active, able to carry on all pre-disease performance without restriction] : Status 0 - Fully active, able to carry on all pre-disease performance without restriction [Normal] : affect appropriate [de-identified] : well healed midline infraumbilical scar,

## 2021-02-12 NOTE — ASSESSMENT
[FreeTextEntry1] :  In summary this is a 60 year old woman with a diagnosis of Stage 1C2 endometrioid adenocarcinoma of ovary s/p LUCINA/BSO with no residual disease.\par s/p 6 cycles of Carboplatin and Taxol, tolerated it well, completed in 12/19\par LULA\par \par RECOMMENDATIONS\par I had a long discussion with the pt regarding follow up of early stage ovarian cancer.\par Signs and symptoms of recurrence were discussed.\par She will follow up with me Q 4 months.\par Follow up with GYN ONC as recommended.\par She has never undergone a  colonoscopy, GI referral given again. \par Labs from 8/2020 discussed with patient and daughter. Labs ordered today CBC, CMP, .\par Will provide script for mammogram. \par Follow up with PMD. \par \par Case was seen and discussed with Dr. Herman who agreed with the assessment and plan.\par \par \par

## 2021-02-12 NOTE — REVIEW OF SYSTEMS
[Negative] : Allergic/Immunologic [FreeTextEntry2] : Never had colonoscopy , last mammogram was in 2019

## 2021-02-12 NOTE — HISTORY OF PRESENT ILLNESS
[Disease: _____________________] : Disease: [unfilled] [T: ___] : T[unfilled] [N: ___] : N[unfilled] [M: ___] : M[unfilled] [AJCC Stage: ____] : AJCC Stage: [unfilled] [de-identified] : 59 yof, LMP at age 49, h/o hypercholesterolemia originally presented to ED in July 2019 for 2 weeks of yellow vaginal discharge and 1 day of light vaginal bleeding.  She saw Dr. Lockhart in [place] and was found to have a pelvic mass and a possible "swollen tube" according to outpatient sonogram and MRI done in [place].  Patient subsequently saw Dr. Mcnally and was scheduled for laparoscopic LUCINA/BSO, which was converted to exploratory laparotomy secondary to multiple adhesions.  \par \par Work up\par Complete Pelvic Sonogram (7/26/2019): The uterus measures 7.4 x 5.6 x 3.8 cm, with normal echogenicity \par and morphology. The endometrial echo complex measures 0.3 cm, which is normal in thickness. A very trace amount of endometrial fluid is seen. Within the right adnexa, extending past the midline, a part-cystic, heterogeneous mass with internal vascularity measures 12.4 x 11.2 x 7.2 cm without a distinguishable native ovary. An associated dilated tubular structure is seen adjacent to this mass, possibly representing hydrosalpinx. Nonspecific nonvisualization of the left ovary. No free fluid in the pelvis.\par \par MRI (6/4/2019): midline complex cystic and solid enhancing lesion likely originating from the ovary/ovaries concerning for cystic ovarian neoplasm. Suspicion of left-sided hydrosalpinx, small intrauterine fibroids.\par \par 7/26/2019:\par CEA 7.2\par CA 19-9 693 \par CA-125 88\par \par  \par Diag lap: LUCINA/BSO 8/12/19\par FINDINGS\par large left adnexal mass, around 6cm in size, friable, large right adnexal mass around 8 cm in size, no normal ovary identified bilaterally, right mass completely adherent to right pelvic side wall, unable to dissect it off safely, unable to identify IP ligament on right side.\par  Post surgery: no gross residual\par \par PATHOLOGY\par 1. Uterus, cervix, bilateral tubes and ovaries, total\par hysterectomy and bilateral salpingo-oophorectomy:\par - Endometrioid adenocarcinoma, of the right ovary, 7 cm,\par moderately differentiated, involving the ovarian surface.\par - Left fallopian tube, involved by carcinoma.\par - Right fallopian tube and paratubal tissue with endometriosis;\par no carcinoma seen.\par - Left ovary with foci of endometriosis; no carcinoma seen.\par - Uterine corpus with leiomyoma and adenomyosis; no carcinoma\par seen.\par - Cervix with Nabothian cysts and mild chronic cervicitis; no\par carcinoma seen.\par - Pathologic staging: pT1c2, pN0, pMx\par \par Pt was started on adjuvant chemotherapy on 9/11/19 with Carboplatin and Taxol\par  [de-identified] : Endometrioid [de-identified] : Normal nuclear expression of all proteins is seen in tumor cell\par nuclei.  There is no evidence of mismatch repair deficiency by\par immunohistochemical evaluation.\par Final Diagnosis\par 1. Uterus, cervix, bilateral tubes and ovaries, total\par hysterectomy and bilateral salpingo-oophorectomy:\par - Endometrioid adenocarcinoma, of the right ovary, 7 cm,\par moderately differentiated, involving the ovarian surface.\par - Left fallopian tube, involved by carcinoma.\par - Right fallopian tube and paratubal tissue with endometriosis;\par no carcinoma seen.\par - Left ovary with foci of endometriosis; no carcinoma seen.\par - Uterine corpus with leiomyoma and adenomyosis; no carcinoma\par seen.\par - Cervix with Nabothian cysts and mild chronic cervicitis; no\par carcinoma seen.\par - Pathologic staging: pT1c2, pN0, pMx\par \par 2. Omentum tissue, omentectomy:\par -  Fibroadipose tissue; no carcinoma seen.\par \par 3. Appendix, appendectomy:\par - Appendix with acute periappendicitis; no carcinoma seen.\par \par 4. Lymph node, right para-aortic, dissection:\par - Three lymph nodes, no carcinoma seen (0/3).\par \par 5. Lymph node, left common iliac, dissection:\par -  Nine lymph nodes, no carcinoma seen (0/9).\par \par 6. Lymph node, left pelvic, dissection:\par -  Twelve lymph nodes, no carcinoma seen (0/12).\par \par 7. Lymph node, left , dissection:\par - Six lymph nodes, no carcinoma seen (0/6).\par  [de-identified] : Pt has had an uneventful post op course.\par No abdominal pain, bowel abnormalities.\par Appetite is improving.\par No vaginal bleeding or discharge\par \par 10/3/19\par Pt is here for follow up.\par She is s/p 1 cycle of Carboplatin/Taxol\par She took nausea meds for 2-3 days, although she was not nauseous.\par She however was constipated x 4 days, which was relieved with Colace and Dulcolax.\par No vaginal bleeding.\par Has twinges of pain in RUQ and at the site of surgery.\par no fever or mouth sores.\par \par 10/21/19\par Pt is here for follow up.\par Tolerating chemo well.\par No significant nausea or vomiting. C/O GERD like symptoms.\par No abd pain, vag bleeding\par no fever or mouth sores.\par C/O scalp pain, hair is falling.\par 11/11/19\par \par pt is here for follow up.\par C/O having had nausea in the first week with improvement  after she takes nausea meds,\par No fever, mouth sores or diarrhea\par \par 12/2/19\par Pt is here for follow up.\par No new complaints.\par Tolerated chemo cycle #4 well.\par No N, V, D, fever\par No mouth sores, abdominal pain.\par no neuropathy\par \par 1/20/20\par Pt is here for follow up.\par Completed 6 cycles of Carboplatin and Taxol in 12/19.\par C/O mild tingling and numbness in her fingers and toes.\par Also had rectal bleeding a few drops post constipation ? hemorrhoids.\par No fever, abdominal pain nausea or vomiting.\par C/O hot flashes and sweats.\par \par 8/17/20\par Pt is here for follow up.\par No new complaints. Feels well.\par No abd pain, N, V, d\par No vaginal bleeding\par Has not followed here for over 6 mths\par \par 02/09/21\par LEIF JOHNSON a 60 year F is here today for follow up. \par Denies abdominal pain or bloating, NVD. Denies vaginal bleeding. \par She reports left CW discomfort only while sleeping on left side started a few months ago.  Denies CP, palpations. \par Last mammogram 12/2019. Never had a colonscopy. \par

## 2021-06-08 ENCOUNTER — OUTPATIENT (OUTPATIENT)
Dept: OUTPATIENT SERVICES | Facility: HOSPITAL | Age: 61
LOS: 1 days | Discharge: HOME | End: 2021-06-08

## 2021-06-08 ENCOUNTER — APPOINTMENT (OUTPATIENT)
Dept: HEMATOLOGY ONCOLOGY | Facility: CLINIC | Age: 61
End: 2021-06-08
Payer: COMMERCIAL

## 2021-06-08 ENCOUNTER — LABORATORY RESULT (OUTPATIENT)
Age: 61
End: 2021-06-08

## 2021-06-08 ENCOUNTER — APPOINTMENT (OUTPATIENT)
Dept: GYNECOLOGIC ONCOLOGY | Facility: CLINIC | Age: 61
End: 2021-06-08
Payer: COMMERCIAL

## 2021-06-08 VITALS
HEART RATE: 75 BPM | HEIGHT: 59 IN | DIASTOLIC BLOOD PRESSURE: 65 MMHG | BODY MASS INDEX: 30.24 KG/M2 | WEIGHT: 150 LBS | SYSTOLIC BLOOD PRESSURE: 136 MMHG | TEMPERATURE: 98.1 F

## 2021-06-08 DIAGNOSIS — R19.05 PERIUMBILIC SWELLING, MASS OR LUMP: ICD-10-CM

## 2021-06-08 DIAGNOSIS — K02.9 DENTAL CARIES, UNSPECIFIED: Chronic | ICD-10-CM

## 2021-06-08 DIAGNOSIS — Z87.42 PERSONAL HISTORY OF OTHER DISEASES OF THE FEMALE GENITAL TRACT: ICD-10-CM

## 2021-06-08 PROCEDURE — 99213 OFFICE O/P EST LOW 20 MIN: CPT

## 2021-06-08 PROCEDURE — 99214 OFFICE O/P EST MOD 30 MIN: CPT

## 2021-06-08 NOTE — ASSESSMENT
[FreeTextEntry1] : 61 year old patient  (NSVDx2), s/p LUCINA/BSO surgical staging for Ovarian Cancer Stage 1C 2019. The patient received 6 cycles of carbo/taxol with Dr. Herman and has been with [facility] since completion of her chemotherapy.

## 2021-06-08 NOTE — HISTORY OF PRESENT ILLNESS
[FreeTextEntry1] : 61 year old patient  (NSVDx2), s/p LUCINA/BSO surgical staging for Ovarian Cancer Stage 1C 2019. The patient received 6 cycles of carbo/taxol with Dr. Herman and has been with [facility]IA[facility] since completion of her chemotherapy. She was seen by Dr. Herman earlier today at which time CA-125 was drawn, result is pending. Last CA-125 was 9 in 2020, She denies any vaginal bleeding, discharge or pain. \par  \par

## 2021-06-09 LAB
ALBUMIN SERPL ELPH-MCNC: 4.9 G/DL
ALP BLD-CCNC: 91 U/L
ALT SERPL-CCNC: 13 U/L
ANION GAP SERPL CALC-SCNC: 13 MMOL/L
AST SERPL-CCNC: 20 U/L
BILIRUB SERPL-MCNC: 0.4 MG/DL
BUN SERPL-MCNC: 15 MG/DL
CALCIUM SERPL-MCNC: 10 MG/DL
CANCER AG125 SERPL-ACNC: 10 U/ML
CHLORIDE SERPL-SCNC: 104 MMOL/L
CO2 SERPL-SCNC: 26 MMOL/L
CREAT SERPL-MCNC: 0.8 MG/DL
GLUCOSE SERPL-MCNC: 98 MG/DL
HCT VFR BLD CALC: 40.9 %
HGB BLD-MCNC: 13.5 G/DL
MCHC RBC-ENTMCNC: 27.9 PG
MCHC RBC-ENTMCNC: 33 G/DL
MCV RBC AUTO: 84.5 FL
PLATELET # BLD AUTO: 257 K/UL
PMV BLD: 9.8 FL
POTASSIUM SERPL-SCNC: 4.9 MMOL/L
PROT SERPL-MCNC: 7.2 G/DL
RBC # BLD: 4.84 M/UL
RBC # FLD: 13.6 %
SODIUM SERPL-SCNC: 143 MMOL/L
WBC # FLD AUTO: 6.24 K/UL

## 2021-06-10 NOTE — ASSESSMENT
[FreeTextEntry1] : In summary this is a 61 year old woman with a diagnosis of Stage 1C2 endometrioid adenocarcinoma of ovary s/p LUCINA/BSO with no residual disease.\par She is s/p 6 cycles of Carboplatin and Taxol, tolerated it well and completed in 12/2019.\par LULA\par  has been stable\par \par RECOMMENDATIONS:\par Previous notes and all relevant laboratory results discussed with Dr. Herman and communicated to the patient.\par Signs and symptoms of recurrence were discussed with the patient\par Will do CBC, CMP and  today. Will call patient with the results.\par She is due for colonoscopy - emphasized to have it done\par Will follow up mammogram result from [facility]\par Follow up with PCP and GYN ONC as recommended.\par \par Case was seen and discussed with Dr. Herman who agreed with the assessment and plan.\par \par \par

## 2021-06-10 NOTE — PHYSICAL EXAM
[Fully active, able to carry on all pre-disease performance without restriction] : Status 0 - Fully active, able to carry on all pre-disease performance without restriction [Normal] : affect appropriate [de-identified] : well healed surgical site in the abdomen

## 2021-06-10 NOTE — HISTORY OF PRESENT ILLNESS
[Disease: _____________________] : Disease: [unfilled] [T: ___] : T[unfilled] [N: ___] : N[unfilled] [M: ___] : M[unfilled] [AJCC Stage: ____] : AJCC Stage: [unfilled] [de-identified] : 59 yof, LMP at age 49, h/o hypercholesterolemia originally presented to ED in July 2019 for 2 weeks of yellow vaginal discharge and 1 day of light vaginal bleeding.  She saw Dr. Lockhart in [place] and was found to have a pelvic mass and a possible "swollen tube" according to outpatient sonogram and MRI done in [place].  Patient subsequently saw Dr. Mcnally and was scheduled for laparoscopic LUCINA/BSO, which was converted to exploratory laparotomy secondary to multiple adhesions.  \par \par Work up\par Complete Pelvic Sonogram (7/26/2019): The uterus measures 7.4 x 5.6 x 3.8 cm, with normal echogenicity \par and morphology. The endometrial echo complex measures 0.3 cm, which is normal in thickness. A very trace amount of endometrial fluid is seen. Within the right adnexa, extending past the midline, a part-cystic, heterogeneous mass with internal vascularity measures 12.4 x 11.2 x 7.2 cm without a distinguishable native ovary. An associated dilated tubular structure is seen adjacent to this mass, possibly representing hydrosalpinx. Nonspecific nonvisualization of the left ovary. No free fluid in the pelvis.\par \par MRI (6/4/2019): midline complex cystic and solid enhancing lesion likely originating from the ovary/ovaries concerning for cystic ovarian neoplasm. Suspicion of left-sided hydrosalpinx, small intrauterine fibroids.\par \par 7/26/2019:\par CEA 7.2\par CA 19-9 693 \par CA-125 88\par \par  \par Diag lap: LUCINA/BSO 8/12/19\par FINDINGS\par large left adnexal mass, around 6cm in size, friable, large right adnexal mass around 8 cm in size, no normal ovary identified bilaterally, right mass completely adherent to right pelvic side wall, unable to dissect it off safely, unable to identify IP ligament on right side.\par  Post surgery: no gross residual\par \par PATHOLOGY\par 1. Uterus, cervix, bilateral tubes and ovaries, total\par hysterectomy and bilateral salpingo-oophorectomy:\par - Endometrioid adenocarcinoma, of the right ovary, 7 cm,\par moderately differentiated, involving the ovarian surface.\par - Left fallopian tube, involved by carcinoma.\par - Right fallopian tube and paratubal tissue with endometriosis;\par no carcinoma seen.\par - Left ovary with foci of endometriosis; no carcinoma seen.\par - Uterine corpus with leiomyoma and adenomyosis; no carcinoma\par seen.\par - Cervix with Nabothian cysts and mild chronic cervicitis; no\par carcinoma seen.\par - Pathologic staging: pT1c2, pN0, pMx\par \par Pt was started on adjuvant chemotherapy on 9/11/19 with Carboplatin and Taxol\par  [de-identified] : Endometrioid [de-identified] : Normal nuclear expression of all proteins is seen in tumor cell\par nuclei.  There is no evidence of mismatch repair deficiency by\par immunohistochemical evaluation.\par Final Diagnosis\par 1. Uterus, cervix, bilateral tubes and ovaries, total\par hysterectomy and bilateral salpingo-oophorectomy:\par - Endometrioid adenocarcinoma, of the right ovary, 7 cm,\par moderately differentiated, involving the ovarian surface.\par - Left fallopian tube, involved by carcinoma.\par - Right fallopian tube and paratubal tissue with endometriosis;\par no carcinoma seen.\par - Left ovary with foci of endometriosis; no carcinoma seen.\par - Uterine corpus with leiomyoma and adenomyosis; no carcinoma\par seen.\par - Cervix with Nabothian cysts and mild chronic cervicitis; no\par carcinoma seen.\par - Pathologic staging: pT1c2, pN0, pMx\par \par 2. Omentum tissue, omentectomy:\par -  Fibroadipose tissue; no carcinoma seen.\par \par 3. Appendix, appendectomy:\par - Appendix with acute periappendicitis; no carcinoma seen.\par \par 4. Lymph node, right para-aortic, dissection:\par - Three lymph nodes, no carcinoma seen (0/3).\par \par 5. Lymph node, left common iliac, dissection:\par -  Nine lymph nodes, no carcinoma seen (0/9).\par \par 6. Lymph node, left pelvic, dissection:\par -  Twelve lymph nodes, no carcinoma seen (0/12).\par \par 7. Lymph node, left , dissection:\par - Six lymph nodes, no carcinoma seen (0/6).\par  [de-identified] : Pt has had an uneventful post op course.\par No abdominal pain, bowel abnormalities.\par Appetite is improving.\par No vaginal bleeding or discharge\par \par 10/3/19\par Pt is here for follow up.\par She is s/p 1 cycle of Carboplatin/Taxol\par She took nausea meds for 2-3 days, although she was not nauseous.\par She however was constipated x 4 days, which was relieved with Colace and Dulcolax.\par No vaginal bleeding.\par Has twinges of pain in RUQ and at the site of surgery.\par no fever or mouth sores.\par \par 10/21/19\par Pt is here for follow up.\par Tolerating chemo well.\par No significant nausea or vomiting. C/O GERD like symptoms.\par No abd pain, vag bleeding\par no fever or mouth sores.\par C/O scalp pain, hair is falling.\par 11/11/19\par \par pt is here for follow up.\par C/O having had nausea in the first week with improvement  after she takes nausea meds,\par No fever, mouth sores or diarrhea\par \par 12/2/19\par Pt is here for follow up.\par No new complaints.\par Tolerated chemo cycle #4 well.\par No N, V, D, fever\par No mouth sores, abdominal pain.\par no neuropathy\par \par 1/20/20\par Pt is here for follow up.\par Completed 6 cycles of Carboplatin and Taxol in 12/19.\par C/O mild tingling and numbness in her fingers and toes.\par Also had rectal bleeding a few drops post constipation ? hemorrhoids.\par No fever, abdominal pain nausea or vomiting.\par C/O hot flashes and sweats.\par \par 8/17/20\par Pt is here for follow up.\par No new complaints. Feels well.\par No abd pain, N, V, d\par No vaginal bleeding\par Has not followed here for over 6 mths\par \par 02/09/21\par LEIF JOHNSON a 60 year F is here today for follow up. \par Denies abdominal pain or bloating, NVD. Denies vaginal bleeding. \par She reports left CW discomfort only while sleeping on left side started a few months ago.  Denies CP, palpations. \par Last mammogram 12/2019. Never had a colonscopy. \par \par 6/8/2021\par Pt is here to follow up for ovarian cancer\par She feels today, denies abdominal pain, bloating, nausea/vomiting or weight loss\par She received 2 doses of COVID vaccine\par She is UTD with her gyne\par She did her mammogram @ [facility] in [place]\par She has not done colonoscopy\par \par

## 2021-06-16 DIAGNOSIS — C56.9 MALIGNANT NEOPLASM OF UNSPECIFIED OVARY: ICD-10-CM

## 2021-10-11 NOTE — HISTORY OF PRESENT ILLNESS
[FreeTextEntry1] : 61 year old patient  ( x2) s/p LUCINA/BSO surgical staging for ovarian cancer Stage 1C in 2019.  She was treated with 6 cycles of carbo/taxol by Dr. Herman and has been with NEDZ since completion of chemotherapy.   CA-125 from 2021 is negative (10).  Medical oncology obtain tumor markers and document next visit.  She denies any vaginal bleeding, discharge or pain.   Mrs. Delgado presents for 4 month follow-up examination.

## 2021-10-12 ENCOUNTER — APPOINTMENT (OUTPATIENT)
Dept: GYNECOLOGIC ONCOLOGY | Facility: CLINIC | Age: 61
End: 2021-10-12
Payer: COMMERCIAL

## 2021-10-12 ENCOUNTER — APPOINTMENT (OUTPATIENT)
Dept: HEMATOLOGY ONCOLOGY | Facility: CLINIC | Age: 61
End: 2021-10-12

## 2021-10-19 ENCOUNTER — NON-APPOINTMENT (OUTPATIENT)
Age: 61
End: 2021-10-19

## 2021-11-02 ENCOUNTER — APPOINTMENT (OUTPATIENT)
Dept: GASTROENTEROLOGY | Facility: CLINIC | Age: 61
End: 2021-11-02

## 2022-02-01 ENCOUNTER — APPOINTMENT (OUTPATIENT)
Dept: GYNECOLOGIC ONCOLOGY | Facility: CLINIC | Age: 62
End: 2022-02-01
Payer: COMMERCIAL

## 2022-02-01 ENCOUNTER — OUTPATIENT (OUTPATIENT)
Dept: OUTPATIENT SERVICES | Facility: HOSPITAL | Age: 62
LOS: 1 days | Discharge: HOME | End: 2022-02-01

## 2022-02-01 ENCOUNTER — APPOINTMENT (OUTPATIENT)
Dept: HEMATOLOGY ONCOLOGY | Facility: CLINIC | Age: 62
End: 2022-02-01
Payer: COMMERCIAL

## 2022-02-01 ENCOUNTER — LABORATORY RESULT (OUTPATIENT)
Age: 62
End: 2022-02-01

## 2022-02-01 VITALS
BODY MASS INDEX: 27.21 KG/M2 | SYSTOLIC BLOOD PRESSURE: 128 MMHG | HEIGHT: 59 IN | DIASTOLIC BLOOD PRESSURE: 70 MMHG | WEIGHT: 135 LBS

## 2022-02-01 VITALS
BODY MASS INDEX: 28.22 KG/M2 | WEIGHT: 140 LBS | DIASTOLIC BLOOD PRESSURE: 73 MMHG | HEART RATE: 74 BPM | HEIGHT: 59 IN | SYSTOLIC BLOOD PRESSURE: 139 MMHG

## 2022-02-01 DIAGNOSIS — K02.9 DENTAL CARIES, UNSPECIFIED: Chronic | ICD-10-CM

## 2022-02-01 DIAGNOSIS — N83.8 OTHER NONINFLAMMATORY DISORDERS OF OVARY, FALLOPIAN TUBE AND BROAD LIGAMENT: ICD-10-CM

## 2022-02-01 DIAGNOSIS — Z87.42 PERSONAL HISTORY OF OTHER DISEASES OF THE FEMALE GENITAL TRACT: ICD-10-CM

## 2022-02-01 PROCEDURE — 99214 OFFICE O/P EST MOD 30 MIN: CPT

## 2022-02-01 PROCEDURE — 99213 OFFICE O/P EST LOW 20 MIN: CPT

## 2022-02-01 NOTE — ASSESSMENT
[FreeTextEntry1] : 61 year old patient  (NSVDx2), s/p LUCINA/BSO surgical staging for Ovarian Cancer Stage 1C 2019. The patient received 6 cycles of carbo/taxol with Dr. Herman and has been with NEDZ since completion of her chemotherapy. She was seen by Dr. Herman earlier today at which time CA-125 was drawn, result is pending. Last CA-125 was 9 in 2020, She denies any vaginal bleeding, discharge or pain. The patient appears to be doing well with no clinical evidence of disease. She complains of occasional dyspareunia and informed about HRT vs lubrication. She is not interested in HRT risk at this time and will work on vaginal lubrication.\par \par  \par

## 2022-02-01 NOTE — PHYSICAL EXAM
[Fully active, able to carry on all pre-disease performance without restriction] : Status 0 - Fully active, able to carry on all pre-disease performance without restriction [Normal] : affect appropriate [de-identified] : well healed surgical site in the abdomen

## 2022-02-01 NOTE — HISTORY OF PRESENT ILLNESS
[Disease: _____________________] : Disease: [unfilled] [T: ___] : T[unfilled] [N: ___] : N[unfilled] [M: ___] : M[unfilled] [AJCC Stage: ____] : AJCC Stage: [unfilled] [de-identified] : 59 yof, LMP at age 49, h/o hypercholesterolemia originally presented to ED in July 2019 for 2 weeks of yellow vaginal discharge and 1 day of light vaginal bleeding.  She saw Dr. Lockhart in [place] and was found to have a pelvic mass and a possible "swollen tube" according to outpatient sonogram and MRI done in [place].  Patient subsequently saw Dr. Mcnally and was scheduled for laparoscopic LUCINA/BSO, which was converted to exploratory laparotomy secondary to multiple adhesions.  \par \par Work up\par Complete Pelvic Sonogram (7/26/2019): The uterus measures 7.4 x 5.6 x 3.8 cm, with normal echogenicity \par and morphology. The endometrial echo complex measures 0.3 cm, which is normal in thickness. A very trace amount of endometrial fluid is seen. Within the right adnexa, extending past the midline, a part-cystic, heterogeneous mass with internal vascularity measures 12.4 x 11.2 x 7.2 cm without a distinguishable native ovary. An associated dilated tubular structure is seen adjacent to this mass, possibly representing hydrosalpinx. Nonspecific nonvisualization of the left ovary. No free fluid in the pelvis.\par \par MRI (6/4/2019): midline complex cystic and solid enhancing lesion likely originating from the ovary/ovaries concerning for cystic ovarian neoplasm. Suspicion of left-sided hydrosalpinx, small intrauterine fibroids.\par \par 7/26/2019:\par CEA 7.2\par CA 19-9 693 \par CA-125 88\par \par  \par Diag lap: LUCINA/BSO 8/12/19\par FINDINGS\par large left adnexal mass, around 6cm in size, friable, large right adnexal mass around 8 cm in size, no normal ovary identified bilaterally, right mass completely adherent to right pelvic side wall, unable to dissect it off safely, unable to identify IP ligament on right side.\par  Post surgery: no gross residual\par \par PATHOLOGY\par 1. Uterus, cervix, bilateral tubes and ovaries, total\par hysterectomy and bilateral salpingo-oophorectomy:\par - Endometrioid adenocarcinoma, of the right ovary, 7 cm,\par moderately differentiated, involving the ovarian surface.\par - Left fallopian tube, involved by carcinoma.\par - Right fallopian tube and paratubal tissue with endometriosis;\par no carcinoma seen.\par - Left ovary with foci of endometriosis; no carcinoma seen.\par - Uterine corpus with leiomyoma and adenomyosis; no carcinoma\par seen.\par - Cervix with Nabothian cysts and mild chronic cervicitis; no\par carcinoma seen.\par - Pathologic staging: pT1c2, pN0, pMx\par \par Pt was started on adjuvant chemotherapy on 9/11/19 with Carboplatin and Taxol\par  [de-identified] : Endometrioid [de-identified] : Normal nuclear expression of all proteins is seen in tumor cell\par nuclei.  There is no evidence of mismatch repair deficiency by\par immunohistochemical evaluation.\par Final Diagnosis\par 1. Uterus, cervix, bilateral tubes and ovaries, total\par hysterectomy and bilateral salpingo-oophorectomy:\par - Endometrioid adenocarcinoma, of the right ovary, 7 cm,\par moderately differentiated, involving the ovarian surface.\par - Left fallopian tube, involved by carcinoma.\par - Right fallopian tube and paratubal tissue with endometriosis;\par no carcinoma seen.\par - Left ovary with foci of endometriosis; no carcinoma seen.\par - Uterine corpus with leiomyoma and adenomyosis; no carcinoma\par seen.\par - Cervix with Nabothian cysts and mild chronic cervicitis; no\par carcinoma seen.\par - Pathologic staging: pT1c2, pN0, pMx\par \par 2. Omentum tissue, omentectomy:\par -  Fibroadipose tissue; no carcinoma seen.\par \par 3. Appendix, appendectomy:\par - Appendix with acute periappendicitis; no carcinoma seen.\par \par 4. Lymph node, right para-aortic, dissection:\par - Three lymph nodes, no carcinoma seen (0/3).\par \par 5. Lymph node, left common iliac, dissection:\par -  Nine lymph nodes, no carcinoma seen (0/9).\par \par 6. Lymph node, left pelvic, dissection:\par -  Twelve lymph nodes, no carcinoma seen (0/12).\par \par 7. Lymph node, left , dissection:\par - Six lymph nodes, no carcinoma seen (0/6).\par  [de-identified] : Pt has had an uneventful post op course.\par No abdominal pain, bowel abnormalities.\par Appetite is improving.\par No vaginal bleeding or discharge\par \par 10/3/19\par Pt is here for follow up.\par She is s/p 1 cycle of Carboplatin/Taxol\par She took nausea meds for 2-3 days, although she was not nauseous.\par She however was constipated x 4 days, which was relieved with Colace and Dulcolax.\par No vaginal bleeding.\par Has twinges of pain in RUQ and at the site of surgery.\par no fever or mouth sores.\par \par 10/21/19\par Pt is here for follow up.\par Tolerating chemo well.\par No significant nausea or vomiting. C/O GERD like symptoms.\par No abd pain, vag bleeding\par no fever or mouth sores.\par C/O scalp pain, hair is falling.\par 11/11/19\par \par pt is here for follow up.\par C/O having had nausea in the first week with improvement  after she takes nausea meds,\par No fever, mouth sores or diarrhea\par \par 12/2/19\par Pt is here for follow up.\par No new complaints.\par Tolerated chemo cycle #4 well.\par No N, V, D, fever\par No mouth sores, abdominal pain.\par no neuropathy\par \par 1/20/20\par Pt is here for follow up.\par Completed 6 cycles of Carboplatin and Taxol in 12/19.\par C/O mild tingling and numbness in her fingers and toes.\par Also had rectal bleeding a few drops post constipation ? hemorrhoids.\par No fever, abdominal pain nausea or vomiting.\par C/O hot flashes and sweats.\par \par 8/17/20\par Pt is here for follow up.\par No new complaints. Feels well.\par No abd pain, N, V, d\par No vaginal bleeding\par Has not followed here for over 6 mths\par \par 02/09/21\par LEIF JOHNSON a 60 year F is here today for follow up. \par Denies abdominal pain or bloating, NVD. Denies vaginal bleeding. \par She reports left CW discomfort only while sleeping on left side started a few months ago.  Denies CP, palpations. \par Last mammogram 12/2019. Never had a colonscopy. \par \par 6/8/2021\par Pt is here to follow up for ovarian cancer\par She feels today, denies abdominal pain, bloating, nausea/vomiting or weight loss\par She received 2 doses of COVID vaccine\par She is UTD with her gyne\par She did her mammogram @ [facility] in [place]\par She has not done colonoscopy\par \par 2/1/22\par Pt is here for follow up.\par Pt denies abdominal pain or bloating, denies vaginal bleeding or discharge.\par She is UTD with colonoscopy done in 2021 shows hemorrhoids and small polyps.\par UTD with mammo and GYN follow up.\par States she was on a diet and lost 10 lbs\par \par

## 2022-02-01 NOTE — HISTORY OF PRESENT ILLNESS
[FreeTextEntry1] : 61 year old patient  (NSVDx2), s/p LUCINA/BSO surgical staging for Ovarian Cancer Stage 1C 2019. The patient received 6 cycles of carbo/taxol with Dr. Herman and has been with NEKATHERIN since completion of her chemotherapy. She was seen by Dr. Herman in   at which time CA-125 was drawn, result 10. She denies any vaginal bleeding, discharge or pain. \par  \par

## 2022-02-01 NOTE — ASSESSMENT
[FreeTextEntry1] :  In summary this is a 60 year old woman with a diagnosis of Stage 1C2 endometrioid adenocarcinoma of ovary s/p LUCINA/BSO with no residual disease.\par s/p 6 cycles of Carboplatin and Taxol, tolerated it well, completed in 12/19\par LULA\par \par RECOMMENDATIONS\par I had a long discussion with the pt regarding follow up of early stage ovarian cancer.\par Signs and symptoms of recurrence were discussed.\par She will follow up with me Q 6 months.\par Follow up with GYN ONC as recommended, saw Dr Mcnally today.\par \par Labs from last visit d/w pt Labs ordered today CBC, CMP, .\par Asked pt to send copy of mammogram. \par \par \par

## 2022-02-02 DIAGNOSIS — C56.9 MALIGNANT NEOPLASM OF UNSPECIFIED OVARY: ICD-10-CM

## 2022-02-02 LAB
ALBUMIN SERPL ELPH-MCNC: 4.6 G/DL
ALP BLD-CCNC: 94 U/L
ALT SERPL-CCNC: 15 U/L
ANION GAP SERPL CALC-SCNC: 12 MMOL/L
AST SERPL-CCNC: 19 U/L
BILIRUB SERPL-MCNC: 0.2 MG/DL
BUN SERPL-MCNC: 17 MG/DL
CALCIUM SERPL-MCNC: 9.5 MG/DL
CANCER AG125 SERPL-ACNC: 9 U/ML
CHLORIDE SERPL-SCNC: 103 MMOL/L
CO2 SERPL-SCNC: 27 MMOL/L
CREAT SERPL-MCNC: 0.7 MG/DL
GLUCOSE SERPL-MCNC: 89 MG/DL
HCT VFR BLD CALC: 40.4 %
HGB BLD-MCNC: 13.3 G/DL
MCHC RBC-ENTMCNC: 27.9 PG
MCHC RBC-ENTMCNC: 32.9 G/DL
MCV RBC AUTO: 84.9 FL
PLATELET # BLD AUTO: 277 K/UL
PMV BLD: 9.6 FL
POTASSIUM SERPL-SCNC: 4 MMOL/L
PROT SERPL-MCNC: 7.5 G/DL
RBC # BLD: 4.76 M/UL
RBC # FLD: 13.6 %
SODIUM SERPL-SCNC: 142 MMOL/L
WBC # FLD AUTO: 5.05 K/UL

## 2022-03-18 NOTE — DISCUSSION/SUMMARY
[Clean] : was clean [Intact] : was intact [None] : had no drainage [Doing Well] : is doing well [Normal Skin] : normal appearance [Diagnosis/Stage ___] : Given this data, a diagnosis of [unfilled] is rendered. [FreeTextEntry1] : Pathology report revealed endometrioid adenocarcinoma of the right ovary (7 cm) moderately differentiated involving the surface of the right ovary and left fallopian tube. The Right fallopian tube and paratubal tissue had  endometriosis no carcinoma seen.  No other evidence of any disease in omentum or lymph nodes. The patient is optimal with no gross residual disease.  She is considered stage I C2  and is referred for medical oncology. I discussed and encouraged the patient to consider adjuvant chemotherapy.  no deformity, pain or tenderness. no restriction of movement

## 2022-08-16 ENCOUNTER — APPOINTMENT (OUTPATIENT)
Dept: GYNECOLOGIC ONCOLOGY | Facility: CLINIC | Age: 62
End: 2022-08-16

## 2022-08-23 ENCOUNTER — OUTPATIENT (OUTPATIENT)
Dept: OUTPATIENT SERVICES | Facility: HOSPITAL | Age: 62
LOS: 1 days | Discharge: HOME | End: 2022-08-23

## 2022-08-23 ENCOUNTER — APPOINTMENT (OUTPATIENT)
Dept: HEMATOLOGY ONCOLOGY | Facility: CLINIC | Age: 62
End: 2022-08-23

## 2022-08-23 ENCOUNTER — LABORATORY RESULT (OUTPATIENT)
Age: 62
End: 2022-08-23

## 2022-08-23 VITALS
BODY MASS INDEX: 28.63 KG/M2 | HEIGHT: 59 IN | WEIGHT: 142 LBS | TEMPERATURE: 97.3 F | HEART RATE: 70 BPM | DIASTOLIC BLOOD PRESSURE: 75 MMHG | SYSTOLIC BLOOD PRESSURE: 127 MMHG

## 2022-08-23 DIAGNOSIS — K02.9 DENTAL CARIES, UNSPECIFIED: Chronic | ICD-10-CM

## 2022-08-23 LAB
HCT VFR BLD CALC: 40.8 %
HGB BLD-MCNC: 13.4 G/DL
MCHC RBC-ENTMCNC: 27.8 PG
MCHC RBC-ENTMCNC: 32.8 G/DL
MCV RBC AUTO: 84.6 FL
PLATELET # BLD AUTO: 248 K/UL
PMV BLD: 10.1 FL
RBC # BLD: 4.82 M/UL
RBC # FLD: 12.8 %
WBC # FLD AUTO: 4.78 K/UL

## 2022-08-23 PROCEDURE — 99213 OFFICE O/P EST LOW 20 MIN: CPT

## 2022-08-24 LAB
ALBUMIN SERPL ELPH-MCNC: 4.4 G/DL
ALP BLD-CCNC: 85 U/L
ALT SERPL-CCNC: 15 U/L
ANION GAP SERPL CALC-SCNC: 9 MMOL/L
AST SERPL-CCNC: 19 U/L
BILIRUB SERPL-MCNC: 0.3 MG/DL
BUN SERPL-MCNC: 15 MG/DL
CALCIUM SERPL-MCNC: 9.9 MG/DL
CANCER AG125 SERPL-ACNC: 9 U/ML
CHLORIDE SERPL-SCNC: 105 MMOL/L
CO2 SERPL-SCNC: 28 MMOL/L
CREAT SERPL-MCNC: 0.6 MG/DL
EGFR: 101 ML/MIN/1.73M2
GLUCOSE SERPL-MCNC: 87 MG/DL
POTASSIUM SERPL-SCNC: 5.3 MMOL/L
PROT SERPL-MCNC: 7.1 G/DL
SODIUM SERPL-SCNC: 142 MMOL/L

## 2022-08-25 DIAGNOSIS — Z08 ENCOUNTER FOR FOLLOW-UP EXAMINATION AFTER COMPLETED TREATMENT FOR MALIGNANT NEOPLASM: ICD-10-CM

## 2022-08-25 DIAGNOSIS — C56.9 MALIGNANT NEOPLASM OF UNSPECIFIED OVARY: ICD-10-CM

## 2022-08-25 NOTE — REVIEW OF SYSTEMS
[Negative] : Allergic/Immunologic [de-identified] : intermittent tingling and numbness of left arm lasting 4-5x/day, lasting 10 minutes

## 2022-08-25 NOTE — RESULTS/DATA
[FreeTextEntry1] : Mammogram on 5/8/2021 from [facility] was benign.

## 2022-08-25 NOTE — ASSESSMENT
[FreeTextEntry1] : Patient is a 60 year old woman with a diagnosis of Stage 1C2 endometrioid adenocarcinoma of ovary s/p LUCINA/BSO with no residual disease.\par s/p 6 cycles of Carboplatin and Taxol, tolerated it well, completed in 12/19.\par LULA\par \par Mammogram on 5/8/2021 was benign.\par Colonoscopy on 2021 showed hemorrhoids and small polyps.\par \par I had a long discussion with the pt regarding follow up of early stage ovarian cancer.\par She will follow up with me Q 6 months.\par \par RECOMMENDATIONS:\par Previous notes reviewed and all relevant laboratory and radiology results discussed with Dr Herman and were communicated to the patient.\par \par -- Patient is asymptomatic. Signs and symptoms of recurrence were discussed.\par -- Will do CBC, CMP and  today.\par -- Emphasized to follow up with PCP, gyne/onco, GI as recommended and for health screening/maintenance.\par -- Follow up with neuro regarding left hand tingling numbness, Dr Vera's number provided.\par \par All their concerns were addressed during the visit.\par RTC in 6 months.\par \par Case was seen and discussed with Dr. Herman who agreed with assessment and plan.\par

## 2022-08-25 NOTE — HISTORY OF PRESENT ILLNESS
[Disease: _____________________] : Disease: [unfilled] [T: ___] : T[unfilled] [N: ___] : N[unfilled] [M: ___] : M[unfilled] [AJCC Stage: ____] : AJCC Stage: [unfilled] [de-identified] : 59 yof, LMP at age 49, h/o hypercholesterolemia originally presented to ED in July 2019 for 2 weeks of yellow vaginal discharge and 1 day of light vaginal bleeding.  She saw Dr. Lockhart in [place] and was found to have a pelvic mass and a possible "swollen tube" according to outpatient sonogram and MRI done in [place].  Patient subsequently saw Dr. Mcnally and was scheduled for laparoscopic LUCINA/BSO, which was converted to exploratory laparotomy secondary to multiple adhesions.  \par \par Work up\par Complete Pelvic Sonogram (7/26/2019): The uterus measures 7.4 x 5.6 x 3.8 cm, with normal echogenicity \par and morphology. The endometrial echo complex measures 0.3 cm, which is normal in thickness. A very trace amount of endometrial fluid is seen. Within the right adnexa, extending past the midline, a part-cystic, heterogeneous mass with internal vascularity measures 12.4 x 11.2 x 7.2 cm without a distinguishable native ovary. An associated dilated tubular structure is seen adjacent to this mass, possibly representing hydrosalpinx. Nonspecific nonvisualization of the left ovary. No free fluid in the pelvis.\par \par MRI (6/4/2019): midline complex cystic and solid enhancing lesion likely originating from the ovary/ovaries concerning for cystic ovarian neoplasm. Suspicion of left-sided hydrosalpinx, small intrauterine fibroids.\par \par 7/26/2019:\par CEA 7.2\par CA 19-9 693 \par CA-125 88\par \par  \par Diag lap: LUCINA/BSO 8/12/19\par FINDINGS\par large left adnexal mass, around 6cm in size, friable, large right adnexal mass around 8 cm in size, no normal ovary identified bilaterally, right mass completely adherent to right pelvic side wall, unable to dissect it off safely, unable to identify IP ligament on right side.\par  Post surgery: no gross residual\par \par PATHOLOGY\par 1. Uterus, cervix, bilateral tubes and ovaries, total\par hysterectomy and bilateral salpingo-oophorectomy:\par - Endometrioid adenocarcinoma, of the right ovary, 7 cm,\par moderately differentiated, involving the ovarian surface.\par - Left fallopian tube, involved by carcinoma.\par - Right fallopian tube and paratubal tissue with endometriosis;\par no carcinoma seen.\par - Left ovary with foci of endometriosis; no carcinoma seen.\par - Uterine corpus with leiomyoma and adenomyosis; no carcinoma\par seen.\par - Cervix with Nabothian cysts and mild chronic cervicitis; no\par carcinoma seen.\par - Pathologic staging: pT1c2, pN0, pMx\par \par Pt was started on adjuvant chemotherapy on 9/11/19 with Carboplatin and Taxol\par  [de-identified] : Endometrioid [de-identified] : Normal nuclear expression of all proteins is seen in tumor cell\par nuclei.  There is no evidence of mismatch repair deficiency by\par immunohistochemical evaluation.\par Final Diagnosis\par 1. Uterus, cervix, bilateral tubes and ovaries, total\par hysterectomy and bilateral salpingo-oophorectomy:\par - Endometrioid adenocarcinoma, of the right ovary, 7 cm,\par moderately differentiated, involving the ovarian surface.\par - Left fallopian tube, involved by carcinoma.\par - Right fallopian tube and paratubal tissue with endometriosis;\par no carcinoma seen.\par - Left ovary with foci of endometriosis; no carcinoma seen.\par - Uterine corpus with leiomyoma and adenomyosis; no carcinoma\par seen.\par - Cervix with Nabothian cysts and mild chronic cervicitis; no\par carcinoma seen.\par - Pathologic staging: pT1c2, pN0, pMx\par \par 2. Omentum tissue, omentectomy:\par -  Fibroadipose tissue; no carcinoma seen.\par \par 3. Appendix, appendectomy:\par - Appendix with acute periappendicitis; no carcinoma seen.\par \par 4. Lymph node, right para-aortic, dissection:\par - Three lymph nodes, no carcinoma seen (0/3).\par \par 5. Lymph node, left common iliac, dissection:\par -  Nine lymph nodes, no carcinoma seen (0/9).\par \par 6. Lymph node, left pelvic, dissection:\par -  Twelve lymph nodes, no carcinoma seen (0/12).\par \par 7. Lymph node, left , dissection:\par - Six lymph nodes, no carcinoma seen (0/6).\par  [de-identified] : Pt has had an uneventful post op course.\par No abdominal pain, bowel abnormalities.\par Appetite is improving.\par No vaginal bleeding or discharge\par \par 10/3/19\par Pt is here for follow up.\par She is s/p 1 cycle of Carboplatin/Taxol\par She took nausea meds for 2-3 days, although she was not nauseous.\par She however was constipated x 4 days, which was relieved with Colace and Dulcolax.\par No vaginal bleeding.\par Has twinges of pain in RUQ and at the site of surgery.\par no fever or mouth sores.\par \par 10/21/19\par Pt is here for follow up.\par Tolerating chemo well.\par No significant nausea or vomiting. C/O GERD like symptoms.\par No abd pain, vag bleeding\par no fever or mouth sores.\par C/O scalp pain, hair is falling.\par 11/11/19\par \par pt is here for follow up.\par C/O having had nausea in the first week with improvement  after she takes nausea meds,\par No fever, mouth sores or diarrhea\par \par 12/2/19\par Pt is here for follow up.\par No new complaints.\par Tolerated chemo cycle #4 well.\par No N, V, D, fever\par No mouth sores, abdominal pain.\par no neuropathy\par \par 1/20/20\par Pt is here for follow up.\par Completed 6 cycles of Carboplatin and Taxol in 12/19.\par C/O mild tingling and numbness in her fingers and toes.\par Also had rectal bleeding a few drops post constipation ? hemorrhoids.\par No fever, abdominal pain nausea or vomiting.\par C/O hot flashes and sweats.\par \par 8/17/20\par Pt is here for follow up.\par No new complaints. Feels well.\par No abd pain, N, V, d\par No vaginal bleeding\par Has not followed here for over 6 mths\par \par 02/09/21\par LEIF JOHNSON a 60 year F is here today for follow up. \par Denies abdominal pain or bloating, NVD. Denies vaginal bleeding. \par She reports left CW discomfort only while sleeping on left side started a few months ago.  Denies CP, palpations. \par Last mammogram 12/2019. Never had a colonscopy. \par \par 6/8/2021\par Pt is here to follow up for ovarian cancer\par She feels today, denies abdominal pain, bloating, nausea/vomiting or weight loss\par She received 2 doses of COVID vaccine\par She is UTD with her gyne\par She did her mammogram @ [facility] in [place]\par She has not done colonoscopy\par \par 2/1/22\par Pt is here for follow up.\par Pt denies abdominal pain or bloating, denies vaginal bleeding or discharge.\par She is UTD with colonoscopy done in 2021 shows hemorrhoids and small polyps.\par UTD with mammo and GYN follow up.\par States she was on a diet and lost 10 lbs\par \par 8/23/22\par Pt is here for follow up for ovarian cancer, accompanied by her daughter.\par She denies abdominal pain or bloating, nausea, vomiting, vaginal bleeding or discharge.\par She is UTD with colonoscopy (2021) and mammogram.\par She c/o of tingling and numbness of left arm x2-3 months now, usually happens 4-5x/day lasting 10 minutes and resolves spontaneously. She denies chest pain, shortness of breath or headache. She denies trauma to left arm and no neck pain.

## 2022-08-25 NOTE — PHYSICAL EXAM
[Fully active, able to carry on all pre-disease performance without restriction] : Status 0 - Fully active, able to carry on all pre-disease performance without restriction [Normal] : affect appropriate [de-identified] : well healed surgical site in the abdomen [de-identified] : hand grasp equal 5/5 bilateral

## 2022-11-12 NOTE — HISTORY OF PRESENT ILLNESS
[FreeTextEntry1] : 60 year old patient s/p LUCINA/BSO with surgical staging on August 12, 2019 for Stage 1C2 Endometrioid adenocarcinoma of ovary. She received 6 cycles of carboplatin/taxol with Dr. Herman completed in 12/19 and has been w/[facility].  She present for follow up examination.  \par \par  12-Nov-2022 19:40 12-Nov-2022 19:37

## 2023-02-17 NOTE — ASSESSMENT
[FreeTextEntry1] : 62 year old patient  (NSVDx2), s/p LUCINA/BSO surgical staging for Ovarian Cancer Stage 1C 2019. The patient received 6 cycles of carbo/taxol with Dr. Herman and has been with [facility] since completion of her chemotherapy. She was seen by Dr. Herman earlier today at which time CA-125 was drawn, result is pending. Last CA-125 was 9 in 2020, She denies any vaginal bleeding, discharge or pain. The patient appears to be doing well with no clinical evidence of disease. She complains of occasional dyspareunia and informed about HRT vs lubrication. She is not interested in HRT risk at this time and will work on vaginal lubrication.\par \par  \par

## 2023-02-17 NOTE — HISTORY OF PRESENT ILLNESS
[FreeTextEntry1] : 63 year old patient  (NSVDx2), s/p LUCINA/BSO surgical staging for Ovarian Cancer Stage 1C in  2019. The patient received 6 cycles of carbo/taxol with Dr. Herman and has been with [facility] since completion of her chemotherapy. Last seen by Dr. Herman in 2022, CA-125 = 9 . Patient denies any vaginal bleeding, discharge or pain. \par \par Patient presenting today for annual follow-up/health maintenance visit. \par  \par

## 2023-02-21 ENCOUNTER — APPOINTMENT (OUTPATIENT)
Dept: GYNECOLOGIC ONCOLOGY | Facility: CLINIC | Age: 63
End: 2023-02-21

## 2023-03-06 ENCOUNTER — APPOINTMENT (OUTPATIENT)
Dept: HEMATOLOGY ONCOLOGY | Facility: CLINIC | Age: 63
End: 2023-03-06

## 2023-04-10 ENCOUNTER — APPOINTMENT (OUTPATIENT)
Dept: HEMATOLOGY ONCOLOGY | Facility: CLINIC | Age: 63
End: 2023-04-10
Payer: COMMERCIAL

## 2023-04-10 ENCOUNTER — OUTPATIENT (OUTPATIENT)
Dept: OUTPATIENT SERVICES | Facility: HOSPITAL | Age: 63
LOS: 1 days | End: 2023-04-10
Payer: COMMERCIAL

## 2023-04-10 ENCOUNTER — LABORATORY RESULT (OUTPATIENT)
Age: 63
End: 2023-04-10

## 2023-04-10 VITALS
WEIGHT: 143 LBS | TEMPERATURE: 98.2 F | BODY MASS INDEX: 28.83 KG/M2 | HEIGHT: 59 IN | DIASTOLIC BLOOD PRESSURE: 60 MMHG | SYSTOLIC BLOOD PRESSURE: 119 MMHG | HEART RATE: 85 BPM

## 2023-04-10 DIAGNOSIS — K02.9 DENTAL CARIES, UNSPECIFIED: Chronic | ICD-10-CM

## 2023-04-10 DIAGNOSIS — C56.9 MALIGNANT NEOPLASM OF UNSPECIFIED OVARY: ICD-10-CM

## 2023-04-10 LAB
HCT VFR BLD CALC: 39.6 %
HGB BLD-MCNC: 12.7 G/DL
MCHC RBC-ENTMCNC: 26.7 PG
MCHC RBC-ENTMCNC: 32.1 G/DL
MCV RBC AUTO: 83.4 FL
PLATELET # BLD AUTO: 266 K/UL
PMV BLD: 10.4 FL
RBC # BLD: 4.75 M/UL
RBC # FLD: 13.3 %
WBC # FLD AUTO: 4.88 K/UL

## 2023-04-10 PROCEDURE — 80053 COMPREHEN METABOLIC PANEL: CPT

## 2023-04-10 PROCEDURE — 99213 OFFICE O/P EST LOW 20 MIN: CPT

## 2023-04-10 PROCEDURE — 86304 IMMUNOASSAY TUMOR CA 125: CPT

## 2023-04-10 PROCEDURE — 85027 COMPLETE CBC AUTOMATED: CPT

## 2023-04-10 PROCEDURE — 36415 COLL VENOUS BLD VENIPUNCTURE: CPT

## 2023-04-10 NOTE — REVIEW OF SYSTEMS
[Negative] : Allergic/Immunologic [de-identified] : intermittent tingling and numbness of left arm lasting 4-5x/day, lasting 10 minutes

## 2023-04-10 NOTE — ASSESSMENT
[FreeTextEntry1] : Patient is a 63 year old woman with a diagnosis of Stage 1C2 endometrioid adenocarcinoma of ovary s/p LUCINA/BSO with no residual disease.\par s/p 6 cycles of Carboplatin and Taxol, tolerated it well, completed in 12/19.\par LULA\par \par Mammogram on 5/8/2021 was benign.\par Colonoscopy on 2021 showed hemorrhoids and small polyps.\par \par I had a long discussion with the pt regarding follow up of early stage ovarian cancer.\par She will follow up with me Q 6 months.\par \par RECOMMENDATIONS:\par Previous notes reviewed and all relevant laboratory and radiology results discussed \par \par -- Patient is asymptomatic. Signs and symptoms of recurrence were discussed.\par -- Will do CBC, CMP and  today.\par -- Emphasized to follow up with PCP, gyne/onc, GI as recommended and for health screening/maintenance.\par \par All their concerns were addressed during the visit.\par RTC in 6 months.\par \par

## 2023-04-10 NOTE — HISTORY OF PRESENT ILLNESS
[Disease: _____________________] : Disease: [unfilled] [T: ___] : T[unfilled] [N: ___] : N[unfilled] [M: ___] : M[unfilled] [AJCC Stage: ____] : AJCC Stage: [unfilled] [de-identified] : 59 yof, LMP at age 49, h/o hypercholesterolemia originally presented to ED in July 2019 for 2 weeks of yellow vaginal discharge and 1 day of light vaginal bleeding.  She saw Dr. Lockhart in [place] and was found to have a pelvic mass and a possible "swollen tube" according to outpatient sonogram and MRI done in [place].  Patient subsequently saw Dr. Mcnally and was scheduled for laparoscopic LUCINA/BSO, which was converted to exploratory laparotomy secondary to multiple adhesions.  \par \par Work up\par Complete Pelvic Sonogram (7/26/2019): The uterus measures 7.4 x 5.6 x 3.8 cm, with normal echogenicity \par and morphology. The endometrial echo complex measures 0.3 cm, which is normal in thickness. A very trace amount of endometrial fluid is seen. Within the right adnexa, extending past the midline, a part-cystic, heterogeneous mass with internal vascularity measures 12.4 x 11.2 x 7.2 cm without a distinguishable native ovary. An associated dilated tubular structure is seen adjacent to this mass, possibly representing hydrosalpinx. Nonspecific nonvisualization of the left ovary. No free fluid in the pelvis.\par \par MRI (6/4/2019): midline complex cystic and solid enhancing lesion likely originating from the ovary/ovaries concerning for cystic ovarian neoplasm. Suspicion of left-sided hydrosalpinx, small intrauterine fibroids.\par \par 7/26/2019:\par CEA 7.2\par CA 19-9 693 \par CA-125 88\par \par  \par Diag lap: LUCINA/BSO 8/12/19\par FINDINGS\par large left adnexal mass, around 6cm in size, friable, large right adnexal mass around 8 cm in size, no normal ovary identified bilaterally, right mass completely adherent to right pelvic side wall, unable to dissect it off safely, unable to identify IP ligament on right side.\par  Post surgery: no gross residual\par \par PATHOLOGY\par 1. Uterus, cervix, bilateral tubes and ovaries, total\par hysterectomy and bilateral salpingo-oophorectomy:\par - Endometrioid adenocarcinoma, of the right ovary, 7 cm,\par moderately differentiated, involving the ovarian surface.\par - Left fallopian tube, involved by carcinoma.\par - Right fallopian tube and paratubal tissue with endometriosis;\par no carcinoma seen.\par - Left ovary with foci of endometriosis; no carcinoma seen.\par - Uterine corpus with leiomyoma and adenomyosis; no carcinoma\par seen.\par - Cervix with Nabothian cysts and mild chronic cervicitis; no\par carcinoma seen.\par - Pathologic staging: pT1c2, pN0, pMx\par \par Pt was started on adjuvant chemotherapy on 9/11/19 with Carboplatin and Taxol\par  [de-identified] : Endometrioid [de-identified] : Normal nuclear expression of all proteins is seen in tumor cell\par nuclei.  There is no evidence of mismatch repair deficiency by\par immunohistochemical evaluation.\par Final Diagnosis\par 1. Uterus, cervix, bilateral tubes and ovaries, total\par hysterectomy and bilateral salpingo-oophorectomy:\par - Endometrioid adenocarcinoma, of the right ovary, 7 cm,\par moderately differentiated, involving the ovarian surface.\par - Left fallopian tube, involved by carcinoma.\par - Right fallopian tube and paratubal tissue with endometriosis;\par no carcinoma seen.\par - Left ovary with foci of endometriosis; no carcinoma seen.\par - Uterine corpus with leiomyoma and adenomyosis; no carcinoma\par seen.\par - Cervix with Nabothian cysts and mild chronic cervicitis; no\par carcinoma seen.\par - Pathologic staging: pT1c2, pN0, pMx\par \par 2. Omentum tissue, omentectomy:\par -  Fibroadipose tissue; no carcinoma seen.\par \par 3. Appendix, appendectomy:\par - Appendix with acute periappendicitis; no carcinoma seen.\par \par 4. Lymph node, right para-aortic, dissection:\par - Three lymph nodes, no carcinoma seen (0/3).\par \par 5. Lymph node, left common iliac, dissection:\par -  Nine lymph nodes, no carcinoma seen (0/9).\par \par 6. Lymph node, left pelvic, dissection:\par -  Twelve lymph nodes, no carcinoma seen (0/12).\par \par 7. Lymph node, left , dissection:\par - Six lymph nodes, no carcinoma seen (0/6).\par  [de-identified] : \par Pt is here for follow up for ovarian cancer, accompanied by her daughter.\par She denies abdominal pain or bloating, nausea, vomiting, vaginal bleeding or discharge.\par She is UTD with colonoscopy (2021) and mammogram.\par Last GYN exam in 1/22

## 2023-04-10 NOTE — PHYSICAL EXAM
[Fully active, able to carry on all pre-disease performance without restriction] : Status 0 - Fully active, able to carry on all pre-disease performance without restriction [Normal] : affect appropriate [de-identified] : well healed surgical site in the abdomen [de-identified] : hand grasp equal 5/5 bilateral

## 2023-04-11 DIAGNOSIS — C56.9 MALIGNANT NEOPLASM OF UNSPECIFIED OVARY: ICD-10-CM

## 2023-04-11 DIAGNOSIS — Z08 ENCOUNTER FOR FOLLOW-UP EXAMINATION AFTER COMPLETED TREATMENT FOR MALIGNANT NEOPLASM: ICD-10-CM

## 2023-04-11 LAB
ALBUMIN SERPL ELPH-MCNC: 4.3 G/DL
ALP BLD-CCNC: 95 U/L
ALT SERPL-CCNC: 16 U/L
ANION GAP SERPL CALC-SCNC: 12 MMOL/L
AST SERPL-CCNC: 22 U/L
BILIRUB SERPL-MCNC: 0.3 MG/DL
BUN SERPL-MCNC: 13 MG/DL
CALCIUM SERPL-MCNC: 9.3 MG/DL
CANCER AG125 SERPL-ACNC: 12 U/ML
CHLORIDE SERPL-SCNC: 103 MMOL/L
CO2 SERPL-SCNC: 25 MMOL/L
CREAT SERPL-MCNC: 0.6 MG/DL
EGFR: 100 ML/MIN/1.73M2
GLUCOSE SERPL-MCNC: 89 MG/DL
POTASSIUM SERPL-SCNC: 4.6 MMOL/L
PROT SERPL-MCNC: 7.1 G/DL
SODIUM SERPL-SCNC: 140 MMOL/L

## 2023-04-27 NOTE — END OF VISIT
[FreeTextEntry3] : I was physically present for the key portions of the evaluation and management service provided.  I agree with the history and physical, and plan which I have reviewed and edited where appropriate.\par 
4 = No assist / stand by assistance

## 2023-07-18 ENCOUNTER — APPOINTMENT (OUTPATIENT)
Dept: GYNECOLOGIC ONCOLOGY | Facility: CLINIC | Age: 63
End: 2023-07-18
Payer: COMMERCIAL

## 2023-07-18 VITALS — HEIGHT: 59 IN | BODY MASS INDEX: 29.23 KG/M2 | WEIGHT: 145 LBS

## 2023-07-18 DIAGNOSIS — Z85.43 ENCOUNTER FOR FOLLOW-UP EXAMINATION AFTER COMPLETED TREATMENT FOR MALIGNANT NEOPLASM: ICD-10-CM

## 2023-07-18 DIAGNOSIS — Z08 ENCOUNTER FOR FOLLOW-UP EXAMINATION AFTER COMPLETED TREATMENT FOR MALIGNANT NEOPLASM: ICD-10-CM

## 2023-07-18 PROCEDURE — 99214 OFFICE O/P EST MOD 30 MIN: CPT

## 2023-07-18 NOTE — HISTORY OF PRESENT ILLNESS
[FreeTextEntry1] : 61 year old patient  (NSVDx2), s/p LUCINA/BSO surgical staging for Ovarian Cancer Stage 1C 2019. The patient received 6 cycles of carbo/taxol with Dr. Herman and has been with [facility] since completion of her chemotherapy. \par \par Patient last seen in GYN/ONC office 22\par \par  23=12\par \par Pt presents today for follow up.\par \par \par

## 2023-07-18 NOTE — ASSESSMENT
[FreeTextEntry1] : 62 year old patient  (NSVDx2), s/p LUCINA/BSO surgical staging for Ovarian Cancer Stage 1C 2019. The patient received 6 cycles of carbo/taxol with Dr. Herman and has been with [facility] since completion of her chemotherapy. She denies any vaginal bleeding, discharge or pain, and appears to be doing well with no clinical evidence of disease. She complains of occasional dyspareunia and informed about HRT vs lubrication. She is not interested in HRT risk at this time and will work on vaginal lubrication.\par \par

## 2023-12-06 NOTE — ASU PATIENT PROFILE, ADULT - PRO MENTAL HEALTH SX RECENT
"               [facility] Risk Assessment Scale    Patient Name: Nohelia Turk  YOB: 1969  MRN: [id number]            RIsk Factor Measure Recommendation Patient Data Scale/Score   BMI >40 Reconsider surgery, weight loss   Estimated body mass index is 33.67 kg/m² as calculated from the following:    Height as of 12/5/23: 5' 7" (1.702 m).    Weight as of this encounter: 97.5 kg (215 lb).   [] 0 = 1 - 24.9  [] 1 = 25-29.9  [] 2 = 30-34.9  [x] 3 = 35-39.9  [] 4 = 40-44.9  [] 5 = 45-99.9   Hemoglobin AIC (if applicable) >9 Delay surgery until DM under control  Refer for:  Nutrition Therapy  Exercise   Medication    Lab Results   Component Value Date    HGBA1C 5.9 01/19/2010       Lab Results   Component Value Date     12/06/2023      [] 0 = 4.0-5.6  [x] 1 = 5.7-6.4  [] 2 = 6.5-6.9  [] 3 = 7.0-7.9  [] 4 = 8.0-8.9  [] 5 = 9.0-12   Hemoglobin (Anemia) <9 Delay surgery   Correct anemia Lab Results   Component Value Date    HGB 14.6 12/06/2023    [] 20 - <9.0                    Albumin <3 Delay surgery &Workup Lab Results   Component Value Date    ALBUMIN 4.2 01/25/2023    [] 20 - <3.0   Smoking Cessation >4 Weeks Delay Surgery  Refer to OP Cessation Class    Current Everyday Smoker [x] 20 - current smoker                                __1__ PPD                    Hx of MI, PE, Arrhythmia, CVA, DVT <30 Days Delay Surgery    N/A [] 20      Infection Variable Delay surgery and re-evaluate   N/A [] 20 - recent/current infection     Depression (PHQ) >10 out of 27 Delay Surgery and re-evaluate  Medication  Counseling              [x] 0     []1     []2     []3      []4      [] 5                    (1-4)      (5-9)  (10-14)  (15-19)   (20-27)     Memory Impairment & Memory loss (Mini-Cog Screening Tool) Advanced dementia and/or Parkinson's Reconsider surgery     [x] 0     []1     []2     []3     []4     [] 5     Physical Conditioning (Modified AM-PAC Per Physical Therapy at [facility]) Unable to ambulate on day of " surgery Delay surgery and re-evaluate  Pre-Rehabilitation   (PT evaluation)       [x]  0   []4       []8     []12        []16     []20       (<20%)   (<40%)   (<60%)   (<80% )    (>80%)     Home Environment/Caregiver support  (Per /Navigator Interview)    Availability of basic services and/or approprate assistance during post-operative period Delay surgery and re-evaluate  Safe home environment  Health   1 week post-surgery  Transportation  availability  Ability to obtain DME/Medications post-op    [x] 0     []1     []2     []3     []4     [] 5  [x] 0     []1     []2     []3     []4     [] 5  [x] 0     []1     []2     []3     []4     [] 5  [x] 0     []1     []2     []3     []4     [] 5         MD Contact: Dr. Carlos Alberto Bejarano Comments:  Total Score:  24       none

## 2023-12-29 NOTE — ED PROVIDER NOTE - OBJECTIVE STATEMENT
60 y/o F no PMHx presents to ED with vaginal discharge x 2 weeks. Pt was following with Dr. Saunders in [place], got pelvic MRI which showed left hydrosalpinx and ovarian neoplasm. Pt says that she has had yellow vaginal discharge for two weeks, took 1 dose of fluconazole with no improvement. Pt reports vaginal bleeding since yesterday, 6 pads per day without clots. Pt wanted second opinion so scheduled appointment with Dr. Mcnally for this Tuesday but came to ED because of the vaginal bleeding. Denies fevers/chills, nausea, vomiting. LMP at 49 years old. Reports increased urinary frequency. No STD hx. Allergen Immunotherapy: Adult  Verified patient's name and . Patient stated reason for visit today is to receive allergy shot(s). Patient denied any concerns with previous allergy injections. Allergy injections (x2) prepared and administered subcutaneous. Ice applied post-injection per patient preference. Administration of allergy injection documented in Immunotherapy flow sheet in EHR and paper chart (see for further information regarding injection). Patient declined to wait in facility for 30 minutes post-injection. Patient left clinic ambulatory, AMA.    SUJIT CAMPA RN ....................  2023   3:29 PM

## 2024-02-20 ENCOUNTER — APPOINTMENT (OUTPATIENT)
Dept: HEMATOLOGY ONCOLOGY | Facility: CLINIC | Age: 64
End: 2024-02-20
Payer: COMMERCIAL

## 2024-02-20 ENCOUNTER — APPOINTMENT (OUTPATIENT)
Dept: GYNECOLOGIC ONCOLOGY | Facility: CLINIC | Age: 64
End: 2024-02-20
Payer: COMMERCIAL

## 2024-02-20 ENCOUNTER — OUTPATIENT (OUTPATIENT)
Dept: OUTPATIENT SERVICES | Facility: HOSPITAL | Age: 64
LOS: 1 days | End: 2024-02-20
Payer: COMMERCIAL

## 2024-02-20 VITALS
WEIGHT: 144 LBS | SYSTOLIC BLOOD PRESSURE: 121 MMHG | HEART RATE: 66 BPM | TEMPERATURE: 97.8 F | BODY MASS INDEX: 29.03 KG/M2 | DIASTOLIC BLOOD PRESSURE: 77 MMHG | HEIGHT: 59 IN

## 2024-02-20 VITALS
DIASTOLIC BLOOD PRESSURE: 67 MMHG | HEART RATE: 96 BPM | BODY MASS INDEX: 29.23 KG/M2 | SYSTOLIC BLOOD PRESSURE: 126 MMHG | WEIGHT: 145 LBS | HEIGHT: 59 IN

## 2024-02-20 DIAGNOSIS — C56.9 MALIGNANT NEOPLASM OF UNSPECIFIED OVARY: ICD-10-CM

## 2024-02-20 DIAGNOSIS — K02.9 DENTAL CARIES, UNSPECIFIED: Chronic | ICD-10-CM

## 2024-02-20 LAB
BASOPHILS # BLD AUTO: 0.04 K/UL
BASOPHILS NFR BLD AUTO: 0.9 %
EOSINOPHIL # BLD AUTO: 0.1 K/UL
EOSINOPHIL NFR BLD AUTO: 2.2 %
HCT VFR BLD CALC: 41.4 %
HGB BLD-MCNC: 13.4 G/DL
IMM GRANULOCYTES NFR BLD AUTO: 0.4 %
LYMPHOCYTES # BLD AUTO: 1.98 K/UL
LYMPHOCYTES NFR BLD AUTO: 43.9 %
MAN DIFF?: NORMAL
MCHC RBC-ENTMCNC: 27.4 PG
MCHC RBC-ENTMCNC: 32.4 G/DL
MCV RBC AUTO: 84.7 FL
MONOCYTES # BLD AUTO: 0.31 K/UL
MONOCYTES NFR BLD AUTO: 6.9 %
NEUTROPHILS # BLD AUTO: 2.06 K/UL
NEUTROPHILS NFR BLD AUTO: 45.7 %
PLATELET # BLD AUTO: 279 K/UL
PMV BLD AUTO: 0 /100 WBCS
RBC # BLD: 4.89 M/UL
RBC # FLD: 13.5 %
WBC # FLD AUTO: 4.51 K/UL

## 2024-02-20 PROCEDURE — 99213 OFFICE O/P EST LOW 20 MIN: CPT

## 2024-02-20 PROCEDURE — 86304 IMMUNOASSAY TUMOR CA 125: CPT

## 2024-02-20 PROCEDURE — 80053 COMPREHEN METABOLIC PANEL: CPT

## 2024-02-20 PROCEDURE — 85027 COMPLETE CBC AUTOMATED: CPT

## 2024-02-20 NOTE — HISTORY OF PRESENT ILLNESS
[FreeTextEntry1] : 64 year old patient  (NSVDx2), s/p LUCINA/BSO surgical staging for Ovarian Cancer Stage 1C 2019. The patient received 6 cycles of carbo/taxol with Dr. Herman and has been with [facility] since completion of her chemotherapy.    23=12  Patient last seen in GYN/ONC office 23 where patient was doing well with no clinical evidence of disease, and c/o occasional dyspareunia for which she was counseled on HRT vs lubrication, patient elected to work on vaginal lubrication,   Pt presents today for follow up.

## 2024-02-20 NOTE — ASSESSMENT
[FreeTextEntry1] : 64 year old patient  (NSVDx2), s/p LUCINA/BSO surgical staging for Ovarian Cancer Stage 1C 2019. The patient received 6 cycles of carbo/taxol with Dr. Herman and has been with [facility] since completion of her chemotherapy.    23=12  Pt presents today for follow up. The patient appears to be doing well with no clinical evidence of disease.

## 2024-02-21 DIAGNOSIS — C56.9 MALIGNANT NEOPLASM OF UNSPECIFIED OVARY: ICD-10-CM

## 2024-02-22 LAB
ALBUMIN SERPL ELPH-MCNC: 4.5 G/DL
ALP BLD-CCNC: 92 U/L
ALT SERPL-CCNC: 18 U/L
ANION GAP SERPL CALC-SCNC: 11 MMOL/L
AST SERPL-CCNC: 20 U/L
BILIRUB SERPL-MCNC: 0.3 MG/DL
BUN SERPL-MCNC: 14 MG/DL
CALCIUM SERPL-MCNC: 9.6 MG/DL
CANCER AG125 SERPL-ACNC: 9 U/ML
CHLORIDE SERPL-SCNC: 103 MMOL/L
CO2 SERPL-SCNC: 26 MMOL/L
CREAT SERPL-MCNC: 0.7 MG/DL
EGFR: 96 ML/MIN/1.73M2
GLUCOSE SERPL-MCNC: 90 MG/DL
POTASSIUM SERPL-SCNC: 4.7 MMOL/L
PROT SERPL-MCNC: 7.5 G/DL
SODIUM SERPL-SCNC: 140 MMOL/L

## 2024-02-23 NOTE — HISTORY OF PRESENT ILLNESS
[Disease: _____________________] : Disease: [unfilled] [T: ___] : T[unfilled] [N: ___] : N[unfilled] [M: ___] : M[unfilled] [AJCC Stage: ____] : AJCC Stage: [unfilled] [de-identified] : 59 yof, LMP at age 49, h/o hypercholesterolemia originally presented to ED in July 2019 for 2 weeks of yellow vaginal discharge and 1 day of light vaginal bleeding.  She saw Dr. Lockhart in [place] and was found to have a pelvic mass and a possible "swollen tube" according to outpatient sonogram and MRI done in [place].  Patient subsequently saw Dr. Mcnally and was scheduled for laparoscopic LUCINA/BSO, which was converted to exploratory laparotomy secondary to multiple adhesions.  \par  \par  Work up\par  Complete Pelvic Sonogram (7/26/2019): The uterus measures 7.4 x 5.6 x 3.8 cm, with normal echogenicity \par  and morphology. The endometrial echo complex measures 0.3 cm, which is normal in thickness. A very trace amount of endometrial fluid is seen. Within the right adnexa, extending past the midline, a part-cystic, heterogeneous mass with internal vascularity measures 12.4 x 11.2 x 7.2 cm without a distinguishable native ovary. An associated dilated tubular structure is seen adjacent to this mass, possibly representing hydrosalpinx. Nonspecific nonvisualization of the left ovary. No free fluid in the pelvis.\par  \par  MRI (6/4/2019): midline complex cystic and solid enhancing lesion likely originating from the ovary/ovaries concerning for cystic ovarian neoplasm. Suspicion of left-sided hydrosalpinx, small intrauterine fibroids.\par  \par  7/26/2019:\par  CEA 7.2\par  CA 19-9 693 \par  CA-125 88\par  \par   \par  Diag lap: LUCINA/BSO 8/12/19\par  FINDINGS\par  large left adnexal mass, around 6cm in size, friable, large right adnexal mass around 8 cm in size, no normal ovary identified bilaterally, right mass completely adherent to right pelvic side wall, unable to dissect it off safely, unable to identify IP ligament on right side.\par   Post surgery: no gross residual\par  \par  PATHOLOGY\par  1. Uterus, cervix, bilateral tubes and ovaries, total\par  hysterectomy and bilateral salpingo-oophorectomy:\par  - Endometrioid adenocarcinoma, of the right ovary, 7 cm,\par  moderately differentiated, involving the ovarian surface.\par  - Left fallopian tube, involved by carcinoma.\par  - Right fallopian tube and paratubal tissue with endometriosis;\par  no carcinoma seen.\par  - Left ovary with foci of endometriosis; no carcinoma seen.\par  - Uterine corpus with leiomyoma and adenomyosis; no carcinoma\par  seen.\par  - Cervix with Nabothian cysts and mild chronic cervicitis; no\par  carcinoma seen.\par  - Pathologic staging: pT1c2, pN0, pMx\par  \par  Pt was started on adjuvant chemotherapy on 9/11/19 with Carboplatin and Taxol\par   [de-identified] : Endometrioid [de-identified] : Normal nuclear expression of all proteins is seen in tumor cell\par  nuclei.  There is no evidence of mismatch repair deficiency by\par  immunohistochemical evaluation.\par  Final Diagnosis\par  1. Uterus, cervix, bilateral tubes and ovaries, total\par  hysterectomy and bilateral salpingo-oophorectomy:\par  - Endometrioid adenocarcinoma, of the right ovary, 7 cm,\par  moderately differentiated, involving the ovarian surface.\par  - Left fallopian tube, involved by carcinoma.\par  - Right fallopian tube and paratubal tissue with endometriosis;\par  no carcinoma seen.\par  - Left ovary with foci of endometriosis; no carcinoma seen.\par  - Uterine corpus with leiomyoma and adenomyosis; no carcinoma\par  seen.\par  - Cervix with Nabothian cysts and mild chronic cervicitis; no\par  carcinoma seen.\par  - Pathologic staging: pT1c2, pN0, pMx\par  \par  2. Omentum tissue, omentectomy:\par  -  Fibroadipose tissue; no carcinoma seen.\par  \par  3. Appendix, appendectomy:\par  - Appendix with acute periappendicitis; no carcinoma seen.\par  \par  4. Lymph node, right para-aortic, dissection:\par  - Three lymph nodes, no carcinoma seen (0/3).\par  \par  5. Lymph node, left common iliac, dissection:\par  -  Nine lymph nodes, no carcinoma seen (0/9).\par  \par  6. Lymph node, left pelvic, dissection:\par  -  Twelve lymph nodes, no carcinoma seen (0/12).\par  \par  7. Lymph node, left , dissection:\par  - Six lymph nodes, no carcinoma seen (0/6).\par   [de-identified] : Pt is here for follow up for ovarian cancer, She denies abdominal pain or bloating, nausea, vomiting, vaginal bleeding or discharge. She is UTD with colonoscopy (2021) and UTD with mammogram. Follows with [facility]

## 2024-02-23 NOTE — PHYSICAL EXAM
[Fully active, able to carry on all pre-disease performance without restriction] : Status 0 - Fully active, able to carry on all pre-disease performance without restriction [Normal] : affect appropriate [de-identified] : well healed surgical site in the abdomen [de-identified] : hand grasp equal 5/5 bilateral

## 2024-02-23 NOTE — ASSESSMENT
[FreeTextEntry1] : Patient is a 63 year old woman with a diagnosis of Stage 1C2 endometrioid adenocarcinoma of ovary s/p LUCINA/BSO with no residual disease. s/p 6 cycles of Carboplatin and Taxol, tolerated it well, completed in 12/19. LULA   I had a long discussion with the pt regarding follow up of early stage ovarian cancer. She will follow up with me Q 6 months until end of 2024 thereafter on an annual basis  RECOMMENDATIONS: Previous notes reviewed and all relevant laboratory and radiology results discussed   -- Patient is asymptomatic. Signs and symptoms of recurrence were discussed. -- Will do CBC, CMP and  today. -- Emphasized to follow up with PCP, gyne/onc, GI as recommended and for health screening/maintenance.  All their concerns were addressed during the visit. RTC in 6 months.

## 2024-08-13 ENCOUNTER — APPOINTMENT (OUTPATIENT)
Age: 64
End: 2024-08-13

## 2024-08-29 NOTE — H&P PST ADULT - HEIGHT IN FEET
Continue Regimen: Refilled Ciclopirox
Samples Given: VTAMA cream
Detail Level: Zone
Render In Strict Bullet Format?: No
Plan: Vaseline or aquaphor as needed
5

## 2025-02-18 ENCOUNTER — OUTPATIENT (OUTPATIENT)
Dept: OUTPATIENT SERVICES | Facility: HOSPITAL | Age: 65
LOS: 1 days | End: 2025-02-18
Payer: COMMERCIAL

## 2025-02-18 ENCOUNTER — APPOINTMENT (OUTPATIENT)
Dept: GYNECOLOGIC ONCOLOGY | Facility: CLINIC | Age: 65
End: 2025-02-18
Payer: COMMERCIAL

## 2025-02-18 ENCOUNTER — APPOINTMENT (OUTPATIENT)
Age: 65
End: 2025-02-18
Payer: COMMERCIAL

## 2025-02-18 VITALS
DIASTOLIC BLOOD PRESSURE: 65 MMHG | BODY MASS INDEX: 29.03 KG/M2 | HEART RATE: 79 BPM | SYSTOLIC BLOOD PRESSURE: 136 MMHG | WEIGHT: 144 LBS | HEIGHT: 59 IN

## 2025-02-18 DIAGNOSIS — C56.9 MALIGNANT NEOPLASM OF UNSPECIFIED OVARY: ICD-10-CM

## 2025-02-18 DIAGNOSIS — N95.2 POSTMENOPAUSAL ATROPHIC VAGINITIS: ICD-10-CM

## 2025-02-18 DIAGNOSIS — K02.9 DENTAL CARIES, UNSPECIFIED: Chronic | ICD-10-CM

## 2025-02-18 LAB
AUTO BASOPHILS #: 0.05 K/UL
AUTO BASOPHILS %: 1 %
AUTO EOSINOPHILS #: 0.17 K/UL
AUTO EOSINOPHILS %: 3.3 %
AUTO IMMATURE GRANULOCYTES #: 0.01 K/UL
AUTO LYMPHOCYTES #: 2.68 K/UL
AUTO LYMPHOCYTES %: 51.8 %
AUTO MONOCYTES #: 0.38 K/UL
AUTO MONOCYTES %: 7.4 %
AUTO NEUTROPHILS #: 1.88 K/UL
AUTO NEUTROPHILS %: 36.3 %
AUTO NRBC #: 0 K/UL
HCT VFR BLD CALC: 38.8 %
HGB BLD-MCNC: 13.1 G/DL
IMM GRANULOCYTES NFR BLD AUTO: 0.2 %
MAN DIFF?: NORMAL
MCHC RBC-ENTMCNC: 28.6 PG
MCHC RBC-ENTMCNC: 33.8 G/DL
MCV RBC AUTO: 84.7 FL
PLATELET # BLD AUTO: 278 K/UL
PMV BLD AUTO: 0 /100 WBCS
PMV BLD: 9.6 FL
RBC # BLD: 4.58 M/UL
RBC # FLD: 13.7 %
WBC # FLD AUTO: 5.17 K/UL

## 2025-02-18 PROCEDURE — 99213 OFFICE O/P EST LOW 20 MIN: CPT

## 2025-02-18 PROCEDURE — 80053 COMPREHEN METABOLIC PANEL: CPT

## 2025-02-18 PROCEDURE — 99214 OFFICE O/P EST MOD 30 MIN: CPT

## 2025-02-18 PROCEDURE — 99459 PELVIC EXAMINATION: CPT

## 2025-02-18 PROCEDURE — 85025 COMPLETE CBC W/AUTO DIFF WBC: CPT

## 2025-02-18 PROCEDURE — 86304 IMMUNOASSAY TUMOR CA 125: CPT

## 2025-02-19 DIAGNOSIS — C56.9 MALIGNANT NEOPLASM OF UNSPECIFIED OVARY: ICD-10-CM

## 2025-02-19 LAB
ALBUMIN SERPL ELPH-MCNC: 4.5 G/DL
ALP BLD-CCNC: 95 U/L
ALT SERPL-CCNC: 14 U/L
ANION GAP SERPL CALC-SCNC: 10 MMOL/L
AST SERPL-CCNC: 17 U/L
BILIRUB SERPL-MCNC: <0.2 MG/DL
BUN SERPL-MCNC: 18 MG/DL
CALCIUM SERPL-MCNC: 9.4 MG/DL
CANCER AG125 SERPL-ACNC: 9 U/ML
CHLORIDE SERPL-SCNC: 103 MMOL/L
CO2 SERPL-SCNC: 26 MMOL/L
CREAT SERPL-MCNC: 0.7 MG/DL
EGFR: 95 ML/MIN/1.73M2
GLUCOSE SERPL-MCNC: 103 MG/DL
POTASSIUM SERPL-SCNC: 4.4 MMOL/L
PROT SERPL-MCNC: 7.1 G/DL
SODIUM SERPL-SCNC: 139 MMOL/L